# Patient Record
Sex: FEMALE | Race: WHITE | Employment: OTHER | URBAN - NONMETROPOLITAN AREA
[De-identification: names, ages, dates, MRNs, and addresses within clinical notes are randomized per-mention and may not be internally consistent; named-entity substitution may affect disease eponyms.]

---

## 2018-02-26 ENCOUNTER — HOSPITAL ENCOUNTER (OUTPATIENT)
Dept: AUDIOLOGY | Age: 73
Discharge: HOME | End: 2018-02-26
Payer: COMMERCIAL

## 2018-02-26 PROCEDURE — V5020 CONFORMITY EVALUATION: HCPCS | Performed by: AUDIOLOGIST

## 2018-02-26 PROCEDURE — V5160 DISPENSING FEE BINAURAL: HCPCS | Performed by: AUDIOLOGIST

## 2018-02-26 PROCEDURE — S0618 AUDIOMETRY FOR HEARING AID: HCPCS | Performed by: AUDIOLOGIST

## 2018-02-26 PROCEDURE — V5010 ASSESSMENT FOR HEARING AID: HCPCS | Performed by: AUDIOLOGIST

## 2018-03-05 ENCOUNTER — HOSPITAL ENCOUNTER (OUTPATIENT)
Age: 73
Setting detail: SPECIMEN
Discharge: HOME | End: 2018-03-05
Payer: MEDICARE

## 2018-03-05 PROCEDURE — 88312 SPECIAL STAINS GROUP 1: CPT

## 2018-03-05 PROCEDURE — 87205 SMEAR GRAM STAIN: CPT

## 2018-03-05 PROCEDURE — 88304 TISSUE EXAM BY PATHOLOGIST: CPT

## 2018-03-05 PROCEDURE — 87070 CULTURE OTHR SPECIMN AEROBIC: CPT

## 2018-03-07 LAB
AEROBIC CULTURE: NORMAL
GRAM STAIN RESULT: NORMAL

## 2018-03-15 ENCOUNTER — TELEPHONE (OUTPATIENT)
Dept: AUDIOLOGY | Age: 73
End: 2018-03-15

## 2018-03-15 NOTE — TELEPHONE ENCOUNTER
Please schedule Sarabjit Delvalle for a New Hearing Aid Fitting with [facility]. Do not register (Patrice Salazar). Thanks.

## 2018-03-23 ENCOUNTER — HOSPITAL ENCOUNTER (OUTPATIENT)
Dept: AUDIOLOGY | Age: 73
Discharge: HOME | End: 2018-03-23

## 2018-03-23 PROCEDURE — 9990000010 HC NO CHARGE VISIT: Performed by: AUDIOLOGIST

## 2020-06-12 ENCOUNTER — HOSPITAL ENCOUNTER (OUTPATIENT)
Dept: AUDIOLOGY | Age: 75
Discharge: HOME | End: 2020-06-12

## 2020-06-12 PROCEDURE — 9990000010 HC NO CHARGE VISIT: Performed by: AUDIOLOGIST

## 2020-09-27 ENCOUNTER — APPOINTMENT (OUTPATIENT)
Dept: CT IMAGING | Age: 75
DRG: 351 | End: 2020-09-27
Payer: MEDICARE

## 2020-09-27 ENCOUNTER — ANESTHESIA (OUTPATIENT)
Dept: OPERATING ROOM | Age: 75
DRG: 351 | End: 2020-09-27
Payer: MEDICARE

## 2020-09-27 ENCOUNTER — APPOINTMENT (OUTPATIENT)
Dept: GENERAL RADIOLOGY | Age: 75
DRG: 351 | End: 2020-09-27
Payer: MEDICARE

## 2020-09-27 ENCOUNTER — HOSPITAL ENCOUNTER (INPATIENT)
Age: 75
LOS: 2 days | Discharge: HOME | DRG: 351 | End: 2020-09-29
Attending: SURGERY | Admitting: SURGERY
Payer: MEDICARE

## 2020-09-27 ENCOUNTER — ANESTHESIA EVENT (OUTPATIENT)
Dept: OPERATING ROOM | Age: 75
DRG: 351 | End: 2020-09-27
Payer: MEDICARE

## 2020-09-27 VITALS
SYSTOLIC BLOOD PRESSURE: 98 MMHG | DIASTOLIC BLOOD PRESSURE: 61 MMHG | TEMPERATURE: 98.6 F | OXYGEN SATURATION: 100 % | RESPIRATION RATE: 17 BRPM

## 2020-09-27 PROBLEM — K40.90 INGUINAL HERNIA, LEFT: Status: ACTIVE | Noted: 2020-09-27

## 2020-09-27 LAB
ALBUMIN SERPL-MCNC: 4.1 G/DL (ref 3.5–5.1)
ALP BLD-CCNC: 52 U/L (ref 38–126)
ALT SERPL-CCNC: 16 U/L (ref 11–66)
ANION GAP SERPL CALCULATED.3IONS-SCNC: 10 MEQ/L (ref 8–16)
AST SERPL-CCNC: 20 U/L (ref 5–40)
BACTERIA: ABNORMAL /HPF
BASOPHILS # BLD: 0.2 %
BASOPHILS ABSOLUTE: 0 THOU/MM3 (ref 0–0.1)
BILIRUB SERPL-MCNC: 0.2 MG/DL (ref 0.3–1.2)
BILIRUBIN URINE: NEGATIVE
BLOOD, URINE: NEGATIVE
BUN BLDV-MCNC: 12 MG/DL (ref 7–22)
CALCIUM SERPL-MCNC: 9.1 MG/DL (ref 8.5–10.5)
CASTS 2: ABNORMAL /LPF
CASTS UA: ABNORMAL /LPF
CHARACTER, URINE: CLEAR
CHLORIDE BLD-SCNC: 99 MEQ/L (ref 98–111)
CO2: 27 MEQ/L (ref 23–33)
COLOR: ABNORMAL
CREAT SERPL-MCNC: 0.4 MG/DL (ref 0.4–1.2)
CRYSTALS, UA: ABNORMAL
EKG ATRIAL RATE: 82 BPM
EKG P AXIS: 78 DEGREES
EKG P-R INTERVAL: 198 MS
EKG Q-T INTERVAL: 388 MS
EKG QRS DURATION: 74 MS
EKG QTC CALCULATION (BAZETT): 453 MS
EKG R AXIS: 61 DEGREES
EKG T AXIS: 80 DEGREES
EKG VENTRICULAR RATE: 82 BPM
EOSINOPHIL # BLD: 0.3 %
EOSINOPHILS ABSOLUTE: 0 THOU/MM3 (ref 0–0.4)
EPITHELIAL CELLS, UA: ABNORMAL /HPF
ERYTHROCYTE [DISTWIDTH] IN BLOOD BY AUTOMATED COUNT: 13.7 % (ref 11.5–14.5)
ERYTHROCYTE [DISTWIDTH] IN BLOOD BY AUTOMATED COUNT: 46.6 FL (ref 35–45)
GFR SERPL CREATININE-BSD FRML MDRD: > 90 ML/MIN/1.73M2
GLUCOSE BLD-MCNC: 143 MG/DL (ref 70–108)
GLUCOSE URINE: NEGATIVE MG/DL
HCT VFR BLD CALC: 37.5 % (ref 37–47)
HEMOGLOBIN: 12.5 GM/DL (ref 12–16)
IMMATURE GRANS (ABS): 0.04 THOU/MM3 (ref 0–0.07)
IMMATURE GRANULOCYTES: 0.4 %
KETONES, URINE: ABNORMAL
LEUKOCYTE ESTERASE, URINE: ABNORMAL
LIPASE: 27.2 U/L (ref 5.6–51.3)
LYMPHOCYTES # BLD: 9 %
LYMPHOCYTES ABSOLUTE: 0.9 THOU/MM3 (ref 1–4.8)
MCH RBC QN AUTO: 30.6 PG (ref 26–33)
MCHC RBC AUTO-ENTMCNC: 33.3 GM/DL (ref 32.2–35.5)
MCV RBC AUTO: 91.7 FL (ref 81–99)
MISCELLANEOUS 2: ABNORMAL
MONOCYTES # BLD: 3.4 %
MONOCYTES ABSOLUTE: 0.4 THOU/MM3 (ref 0.4–1.3)
NITRITE, URINE: POSITIVE
NUCLEATED RED BLOOD CELLS: 0 /100 WBC
OSMOLALITY CALCULATION: 274.2 MOSMOL/KG (ref 275–300)
PH UA: 6 (ref 5–9)
PLATELET # BLD: 197 THOU/MM3 (ref 130–400)
PMV BLD AUTO: 11.5 FL (ref 9.4–12.4)
POTASSIUM REFLEX MAGNESIUM: 3.7 MEQ/L (ref 3.5–5.2)
PRO-BNP: 67.7 PG/ML (ref 0–900)
PROTEIN UA: NEGATIVE
RBC # BLD: 4.09 MILL/MM3 (ref 4.2–5.4)
RBC URINE: ABNORMAL /HPF
RENAL EPITHELIAL, UA: ABNORMAL
SARS-COV-2, NAAT: NOT DETECTED
SEG NEUTROPHILS: 86.7 %
SEGMENTED NEUTROPHILS ABSOLUTE COUNT: 9 THOU/MM3 (ref 1.8–7.7)
SODIUM BLD-SCNC: 136 MEQ/L (ref 135–145)
SPECIFIC GRAVITY, URINE: 1.02 (ref 1–1.03)
TOTAL PROTEIN: 7 G/DL (ref 6.1–8)
TROPONIN T: < 0.01 NG/ML
UROBILINOGEN, URINE: 1 EU/DL (ref 0–1)
WBC # BLD: 10.4 THOU/MM3 (ref 4.8–10.8)
WBC UA: ABNORMAL /HPF
YEAST: ABNORMAL

## 2020-09-27 PROCEDURE — 87086 URINE CULTURE/COLONY COUNT: CPT

## 2020-09-27 PROCEDURE — 2500000003 HC RX 250 WO HCPCS: Performed by: NURSE ANESTHETIST, CERTIFIED REGISTERED

## 2020-09-27 PROCEDURE — 3600000012 HC SURGERY LEVEL 2 ADDTL 15MIN: Performed by: SURGERY

## 2020-09-27 PROCEDURE — 99285 EMERGENCY DEPT VISIT HI MDM: CPT

## 2020-09-27 PROCEDURE — 84484 ASSAY OF TROPONIN QUANT: CPT

## 2020-09-27 PROCEDURE — 96374 THER/PROPH/DIAG INJ IV PUSH: CPT

## 2020-09-27 PROCEDURE — 93010 ELECTROCARDIOGRAM REPORT: CPT | Performed by: INTERNAL MEDICINE

## 2020-09-27 PROCEDURE — 6370000000 HC RX 637 (ALT 250 FOR IP): Performed by: PHYSICIAN ASSISTANT

## 2020-09-27 PROCEDURE — 87186 SC STD MICRODIL/AGAR DIL: CPT

## 2020-09-27 PROCEDURE — APPSS60 APP SPLIT SHARED TIME 46-60 MINUTES: Performed by: PHYSICIAN ASSISTANT

## 2020-09-27 PROCEDURE — 87077 CULTURE AEROBIC IDENTIFY: CPT

## 2020-09-27 PROCEDURE — 6360000002 HC RX W HCPCS: Performed by: ANESTHESIOLOGY

## 2020-09-27 PROCEDURE — 3600000002 HC SURGERY LEVEL 2 BASE: Performed by: SURGERY

## 2020-09-27 PROCEDURE — 3700000001 HC ADD 15 MINUTES (ANESTHESIA): Performed by: SURGERY

## 2020-09-27 PROCEDURE — 96375 TX/PRO/DX INJ NEW DRUG ADDON: CPT

## 2020-09-27 PROCEDURE — U0002 COVID-19 LAB TEST NON-CDC: HCPCS

## 2020-09-27 PROCEDURE — 99222 1ST HOSP IP/OBS MODERATE 55: CPT | Performed by: SURGERY

## 2020-09-27 PROCEDURE — 6360000002 HC RX W HCPCS: Performed by: NURSE ANESTHETIST, CERTIFIED REGISTERED

## 2020-09-27 PROCEDURE — 71045 X-RAY EXAM CHEST 1 VIEW: CPT

## 2020-09-27 PROCEDURE — 93005 ELECTROCARDIOGRAM TRACING: CPT | Performed by: STUDENT IN AN ORGANIZED HEALTH CARE EDUCATION/TRAINING PROGRAM

## 2020-09-27 PROCEDURE — 96376 TX/PRO/DX INJ SAME DRUG ADON: CPT

## 2020-09-27 PROCEDURE — 74176 CT ABD & PELVIS W/O CONTRAST: CPT

## 2020-09-27 PROCEDURE — 49553 RPR FEM HERNIA INIT BLOCKED: CPT | Performed by: SURGERY

## 2020-09-27 PROCEDURE — 0YU80JZ SUPPLEMENT LEFT FEMORAL REGION WITH SYNTHETIC SUBSTITUTE, OPEN APPROACH: ICD-10-PCS | Performed by: SURGERY

## 2020-09-27 PROCEDURE — 83880 ASSAY OF NATRIURETIC PEPTIDE: CPT

## 2020-09-27 PROCEDURE — C1781 MESH (IMPLANTABLE): HCPCS | Performed by: SURGERY

## 2020-09-27 PROCEDURE — 6360000002 HC RX W HCPCS: Performed by: EMERGENCY MEDICINE

## 2020-09-27 PROCEDURE — 85025 COMPLETE CBC W/AUTO DIFF WBC: CPT

## 2020-09-27 PROCEDURE — 83690 ASSAY OF LIPASE: CPT

## 2020-09-27 PROCEDURE — 7100000000 HC PACU RECOVERY - FIRST 15 MIN: Performed by: SURGERY

## 2020-09-27 PROCEDURE — 36415 COLL VENOUS BLD VENIPUNCTURE: CPT

## 2020-09-27 PROCEDURE — 2500000003 HC RX 250 WO HCPCS: Performed by: SURGERY

## 2020-09-27 PROCEDURE — 1200000000 HC SEMI PRIVATE

## 2020-09-27 PROCEDURE — 6360000002 HC RX W HCPCS: Performed by: STUDENT IN AN ORGANIZED HEALTH CARE EDUCATION/TRAINING PROGRAM

## 2020-09-27 PROCEDURE — 2580000003 HC RX 258: Performed by: STUDENT IN AN ORGANIZED HEALTH CARE EDUCATION/TRAINING PROGRAM

## 2020-09-27 PROCEDURE — 2580000003 HC RX 258: Performed by: SURGERY

## 2020-09-27 PROCEDURE — 7100000001 HC PACU RECOVERY - ADDTL 15 MIN: Performed by: SURGERY

## 2020-09-27 PROCEDURE — 80053 COMPREHEN METABOLIC PANEL: CPT

## 2020-09-27 PROCEDURE — 3700000000 HC ANESTHESIA ATTENDED CARE: Performed by: SURGERY

## 2020-09-27 PROCEDURE — 81001 URINALYSIS AUTO W/SCOPE: CPT

## 2020-09-27 PROCEDURE — 6360000002 HC RX W HCPCS: Performed by: SURGERY

## 2020-09-27 DEVICE — MESH HERN L W1.6XL1.9IN INGUINAL WHT POLYPR MFIL PLUG PTCH: Type: IMPLANTABLE DEVICE | Site: GROIN | Status: FUNCTIONAL

## 2020-09-27 RX ORDER — ONDANSETRON 2 MG/ML
4 INJECTION INTRAMUSCULAR; INTRAVENOUS
Status: DISCONTINUED | OUTPATIENT
Start: 2020-09-27 | End: 2020-09-27

## 2020-09-27 RX ORDER — LABETALOL 20 MG/4 ML (5 MG/ML) INTRAVENOUS SYRINGE
5 EVERY 10 MIN PRN
Status: DISCONTINUED | OUTPATIENT
Start: 2020-09-27 | End: 2020-09-27

## 2020-09-27 RX ORDER — SODIUM CHLORIDE 0.9 % (FLUSH) 0.9 %
10 SYRINGE (ML) INJECTION PRN
Status: DISCONTINUED | OUTPATIENT
Start: 2020-09-27 | End: 2020-09-29 | Stop reason: HOSPADM

## 2020-09-27 RX ORDER — MORPHINE SULFATE 2 MG/ML
2 INJECTION, SOLUTION INTRAMUSCULAR; INTRAVENOUS
Status: DISCONTINUED | OUTPATIENT
Start: 2020-09-27 | End: 2020-09-29 | Stop reason: HOSPADM

## 2020-09-27 RX ORDER — TRAMADOL HYDROCHLORIDE 50 MG/1
50 TABLET ORAL EVERY 6 HOURS PRN
Status: DISCONTINUED | OUTPATIENT
Start: 2020-09-27 | End: 2020-09-29

## 2020-09-27 RX ORDER — PROMETHAZINE HYDROCHLORIDE 25 MG/1
12.5 TABLET ORAL EVERY 6 HOURS PRN
Status: DISCONTINUED | OUTPATIENT
Start: 2020-09-27 | End: 2020-09-29 | Stop reason: HOSPADM

## 2020-09-27 RX ORDER — DEXAMETHASONE SODIUM PHOSPHATE 4 MG/ML
INJECTION, SOLUTION INTRA-ARTICULAR; INTRALESIONAL; INTRAMUSCULAR; INTRAVENOUS; SOFT TISSUE PRN
Status: DISCONTINUED | OUTPATIENT
Start: 2020-09-27 | End: 2020-09-27 | Stop reason: SDUPTHER

## 2020-09-27 RX ORDER — FENTANYL CITRATE 50 UG/ML
50 INJECTION, SOLUTION INTRAMUSCULAR; INTRAVENOUS ONCE
Status: COMPLETED | OUTPATIENT
Start: 2020-09-27 | End: 2020-09-27

## 2020-09-27 RX ORDER — ONDANSETRON 2 MG/ML
4 INJECTION INTRAMUSCULAR; INTRAVENOUS ONCE
Status: COMPLETED | OUTPATIENT
Start: 2020-09-27 | End: 2020-09-27

## 2020-09-27 RX ORDER — SODIUM CHLORIDE 0.9 % (FLUSH) 0.9 %
10 SYRINGE (ML) INJECTION PRN
Status: DISCONTINUED | OUTPATIENT
Start: 2020-09-27 | End: 2020-09-27 | Stop reason: SDUPTHER

## 2020-09-27 RX ORDER — FENTANYL CITRATE 50 UG/ML
50 INJECTION, SOLUTION INTRAMUSCULAR; INTRAVENOUS EVERY 5 MIN PRN
Status: DISCONTINUED | OUTPATIENT
Start: 2020-09-27 | End: 2020-09-27

## 2020-09-27 RX ORDER — PROPOFOL 10 MG/ML
INJECTION, EMULSION INTRAVENOUS PRN
Status: DISCONTINUED | OUTPATIENT
Start: 2020-09-27 | End: 2020-09-27 | Stop reason: SDUPTHER

## 2020-09-27 RX ORDER — SODIUM CHLORIDE 9 MG/ML
1000 INJECTION, SOLUTION INTRAVENOUS CONTINUOUS
Status: DISCONTINUED | OUTPATIENT
Start: 2020-09-27 | End: 2020-09-27

## 2020-09-27 RX ORDER — ONDANSETRON 2 MG/ML
4 INJECTION INTRAMUSCULAR; INTRAVENOUS EVERY 6 HOURS PRN
Status: DISCONTINUED | OUTPATIENT
Start: 2020-09-27 | End: 2020-09-29 | Stop reason: HOSPADM

## 2020-09-27 RX ORDER — LIDOCAINE HCL/PF 100 MG/5ML
SYRINGE (ML) INJECTION PRN
Status: DISCONTINUED | OUTPATIENT
Start: 2020-09-27 | End: 2020-09-27 | Stop reason: SDUPTHER

## 2020-09-27 RX ORDER — SODIUM CHLORIDE 0.9 % (FLUSH) 0.9 %
10 SYRINGE (ML) INJECTION EVERY 12 HOURS SCHEDULED
Status: DISCONTINUED | OUTPATIENT
Start: 2020-09-27 | End: 2020-09-27 | Stop reason: SDUPTHER

## 2020-09-27 RX ORDER — ONDANSETRON 4 MG/1
4 TABLET, ORALLY DISINTEGRATING ORAL EVERY 8 HOURS PRN
Status: DISCONTINUED | OUTPATIENT
Start: 2020-09-27 | End: 2020-09-29 | Stop reason: HOSPADM

## 2020-09-27 RX ORDER — BUPIVACAINE HYDROCHLORIDE 5 MG/ML
INJECTION, SOLUTION EPIDURAL; INTRACAUDAL PRN
Status: DISCONTINUED | OUTPATIENT
Start: 2020-09-27 | End: 2020-09-27 | Stop reason: ALTCHOICE

## 2020-09-27 RX ORDER — FENTANYL CITRATE 50 UG/ML
50 INJECTION, SOLUTION INTRAMUSCULAR; INTRAVENOUS
Status: DISCONTINUED | OUTPATIENT
Start: 2020-09-27 | End: 2020-09-27

## 2020-09-27 RX ORDER — MEPERIDINE HYDROCHLORIDE 25 MG/ML
12.5 INJECTION INTRAMUSCULAR; INTRAVENOUS; SUBCUTANEOUS EVERY 5 MIN PRN
Status: DISCONTINUED | OUTPATIENT
Start: 2020-09-27 | End: 2020-09-27

## 2020-09-27 RX ORDER — FENTANYL CITRATE 50 UG/ML
25 INJECTION, SOLUTION INTRAMUSCULAR; INTRAVENOUS
Status: DISCONTINUED | OUTPATIENT
Start: 2020-09-27 | End: 2020-09-27

## 2020-09-27 RX ORDER — FENTANYL CITRATE 50 UG/ML
INJECTION, SOLUTION INTRAMUSCULAR; INTRAVENOUS PRN
Status: DISCONTINUED | OUTPATIENT
Start: 2020-09-27 | End: 2020-09-27 | Stop reason: SDUPTHER

## 2020-09-27 RX ORDER — SODIUM CHLORIDE 9 MG/ML
INJECTION, SOLUTION INTRAVENOUS CONTINUOUS
Status: DISCONTINUED | OUTPATIENT
Start: 2020-09-27 | End: 2020-09-27

## 2020-09-27 RX ORDER — MORPHINE SULFATE 4 MG/ML
4 INJECTION, SOLUTION INTRAMUSCULAR; INTRAVENOUS
Status: DISCONTINUED | OUTPATIENT
Start: 2020-09-27 | End: 2020-09-29 | Stop reason: HOSPADM

## 2020-09-27 RX ORDER — MORPHINE SULFATE 4 MG/ML
4 INJECTION, SOLUTION INTRAMUSCULAR; INTRAVENOUS ONCE
Status: COMPLETED | OUTPATIENT
Start: 2020-09-27 | End: 2020-09-27

## 2020-09-27 RX ORDER — ONDANSETRON 2 MG/ML
INJECTION INTRAMUSCULAR; INTRAVENOUS PRN
Status: DISCONTINUED | OUTPATIENT
Start: 2020-09-27 | End: 2020-09-27 | Stop reason: SDUPTHER

## 2020-09-27 RX ORDER — SUCCINYLCHOLINE/SOD CL,ISO/PF 200MG/10ML
SYRINGE (ML) INTRAVENOUS PRN
Status: DISCONTINUED | OUTPATIENT
Start: 2020-09-27 | End: 2020-09-27 | Stop reason: SDUPTHER

## 2020-09-27 RX ORDER — FENTANYL CITRATE 50 UG/ML
25 INJECTION, SOLUTION INTRAMUSCULAR; INTRAVENOUS EVERY 5 MIN PRN
Status: DISCONTINUED | OUTPATIENT
Start: 2020-09-27 | End: 2020-09-27

## 2020-09-27 RX ORDER — SODIUM CHLORIDE 0.9 % (FLUSH) 0.9 %
10 SYRINGE (ML) INJECTION EVERY 12 HOURS SCHEDULED
Status: DISCONTINUED | OUTPATIENT
Start: 2020-09-27 | End: 2020-09-29 | Stop reason: HOSPADM

## 2020-09-27 RX ORDER — PROMETHAZINE HYDROCHLORIDE 25 MG/ML
6.25 INJECTION, SOLUTION INTRAMUSCULAR; INTRAVENOUS
Status: DISCONTINUED | OUTPATIENT
Start: 2020-09-27 | End: 2020-09-27

## 2020-09-27 RX ORDER — SODIUM CHLORIDE 9 MG/ML
INJECTION, SOLUTION INTRAVENOUS CONTINUOUS
Status: DISCONTINUED | OUTPATIENT
Start: 2020-09-27 | End: 2020-09-29

## 2020-09-27 RX ADMIN — FAMOTIDINE 20 MG: 10 INJECTION INTRAVENOUS at 09:08

## 2020-09-27 RX ADMIN — ONDANSETRON 4 MG: 2 INJECTION INTRAMUSCULAR; INTRAVENOUS at 00:45

## 2020-09-27 RX ADMIN — CEFOXITIN SODIUM 2 G: 2 POWDER, FOR SOLUTION INTRAVENOUS at 09:08

## 2020-09-27 RX ADMIN — SODIUM CHLORIDE: 9 INJECTION, SOLUTION INTRAVENOUS at 19:38

## 2020-09-27 RX ADMIN — CEFTRIAXONE SODIUM 1 G: 1 INJECTION, POWDER, FOR SOLUTION INTRAMUSCULAR; INTRAVENOUS at 03:35

## 2020-09-27 RX ADMIN — FENTANYL CITRATE 50 MCG: 50 INJECTION, SOLUTION INTRAMUSCULAR; INTRAVENOUS at 05:51

## 2020-09-27 RX ADMIN — MORPHINE SULFATE 4 MG: 4 INJECTION, SOLUTION INTRAMUSCULAR; INTRAVENOUS at 13:40

## 2020-09-27 RX ADMIN — HYDROMORPHONE HYDROCHLORIDE 0.5 MG: 1 INJECTION, SOLUTION INTRAMUSCULAR; INTRAVENOUS; SUBCUTANEOUS at 06:00

## 2020-09-27 RX ADMIN — CEFOXITIN SODIUM 2 G: 2 POWDER, FOR SOLUTION INTRAVENOUS at 15:51

## 2020-09-27 RX ADMIN — PROPOFOL 130 MG: 10 INJECTION, EMULSION INTRAVENOUS at 04:59

## 2020-09-27 RX ADMIN — FENTANYL CITRATE 50 MCG: 50 INJECTION, SOLUTION INTRAMUSCULAR; INTRAVENOUS at 02:32

## 2020-09-27 RX ADMIN — ONDANSETRON 4 MG: 2 INJECTION INTRAMUSCULAR; INTRAVENOUS at 04:29

## 2020-09-27 RX ADMIN — FENTANYL CITRATE 25 MCG: 50 INJECTION, SOLUTION INTRAMUSCULAR; INTRAVENOUS at 05:47

## 2020-09-27 RX ADMIN — HYDROMORPHONE HYDROCHLORIDE 0.5 MG: 1 INJECTION, SOLUTION INTRAMUSCULAR; INTRAVENOUS; SUBCUTANEOUS at 05:50

## 2020-09-27 RX ADMIN — FENTANYL CITRATE 25 MCG: 50 INJECTION, SOLUTION INTRAMUSCULAR; INTRAVENOUS at 05:20

## 2020-09-27 RX ADMIN — Medication 130 MG: at 04:59

## 2020-09-27 RX ADMIN — MORPHINE SULFATE 4 MG: 4 INJECTION, SOLUTION INTRAMUSCULAR; INTRAVENOUS at 00:45

## 2020-09-27 RX ADMIN — FAMOTIDINE 20 MG: 10 INJECTION INTRAVENOUS at 21:03

## 2020-09-27 RX ADMIN — FENTANYL CITRATE 50 MCG: 50 INJECTION, SOLUTION INTRAMUSCULAR; INTRAVENOUS at 01:49

## 2020-09-27 RX ADMIN — Medication 80 MG: at 04:59

## 2020-09-27 RX ADMIN — PHENOL 1 SPRAY: 1.4 SPRAY ORAL at 14:57

## 2020-09-27 RX ADMIN — SODIUM CHLORIDE: 9 INJECTION, SOLUTION INTRAVENOUS at 09:08

## 2020-09-27 RX ADMIN — ONDANSETRON HYDROCHLORIDE 4 MG: 4 INJECTION, SOLUTION INTRAMUSCULAR; INTRAVENOUS at 05:05

## 2020-09-27 RX ADMIN — ONDANSETRON 4 MG: 2 INJECTION INTRAMUSCULAR; INTRAVENOUS at 01:49

## 2020-09-27 RX ADMIN — DEXAMETHASONE SODIUM PHOSPHATE 10 MG: 4 INJECTION, SOLUTION INTRAMUSCULAR; INTRAVENOUS at 05:05

## 2020-09-27 RX ADMIN — HYDROMORPHONE HYDROCHLORIDE 0.5 MG: 1 INJECTION, SOLUTION INTRAMUSCULAR; INTRAVENOUS; SUBCUTANEOUS at 06:05

## 2020-09-27 RX ADMIN — SODIUM CHLORIDE 1000 ML: 9 INJECTION, SOLUTION INTRAVENOUS at 00:45

## 2020-09-27 RX ADMIN — HYDROMORPHONE HYDROCHLORIDE 0.5 MG: 1 INJECTION, SOLUTION INTRAMUSCULAR; INTRAVENOUS; SUBCUTANEOUS at 05:55

## 2020-09-27 RX ADMIN — CEFOXITIN SODIUM 2 G: 2 POWDER, FOR SOLUTION INTRAVENOUS at 21:54

## 2020-09-27 ASSESSMENT — PULMONARY FUNCTION TESTS
PIF_VALUE: 18
PIF_VALUE: 17
PIF_VALUE: 17
PIF_VALUE: 2
PIF_VALUE: 24
PIF_VALUE: 0
PIF_VALUE: 4
PIF_VALUE: 17
PIF_VALUE: 18
PIF_VALUE: 17
PIF_VALUE: 17
PIF_VALUE: 21
PIF_VALUE: 17
PIF_VALUE: 14
PIF_VALUE: 2
PIF_VALUE: 17
PIF_VALUE: 17
PIF_VALUE: 1
PIF_VALUE: 22
PIF_VALUE: 18
PIF_VALUE: 4
PIF_VALUE: 18
PIF_VALUE: 17
PIF_VALUE: 10
PIF_VALUE: 17
PIF_VALUE: 18
PIF_VALUE: 4
PIF_VALUE: 0
PIF_VALUE: 9
PIF_VALUE: 17
PIF_VALUE: 18
PIF_VALUE: 13
PIF_VALUE: 2
PIF_VALUE: 18
PIF_VALUE: 17
PIF_VALUE: 13
PIF_VALUE: 2
PIF_VALUE: 1
PIF_VALUE: 17
PIF_VALUE: 18
PIF_VALUE: 1
PIF_VALUE: 17
PIF_VALUE: 2
PIF_VALUE: 17
PIF_VALUE: 17
PIF_VALUE: 19
PIF_VALUE: 19
PIF_VALUE: 17
PIF_VALUE: 15
PIF_VALUE: 15
PIF_VALUE: 13
PIF_VALUE: 17

## 2020-09-27 ASSESSMENT — PAIN SCALES - GENERAL
PAINLEVEL_OUTOF10: 10
PAINLEVEL_OUTOF10: 5
PAINLEVEL_OUTOF10: 10
PAINLEVEL_OUTOF10: 10
PAINLEVEL_OUTOF10: 8
PAINLEVEL_OUTOF10: 10
PAINLEVEL_OUTOF10: 7
PAINLEVEL_OUTOF10: 10
PAINLEVEL_OUTOF10: 8

## 2020-09-27 ASSESSMENT — ENCOUNTER SYMPTOMS
STRIDOR: 0
BACK PAIN: 0
DIARRHEA: 0
VOICE CHANGE: 0
SORE THROAT: 0
SHORTNESS OF BREATH: 0
VOMITING: 1
ABDOMINAL PAIN: 1
BLOOD IN STOOL: 0
CONSTIPATION: 1
WHEEZING: 0
NAUSEA: 1
CHOKING: 0
COUGH: 0
TROUBLE SWALLOWING: 0
CHEST TIGHTNESS: 0
PHOTOPHOBIA: 0

## 2020-09-27 ASSESSMENT — PAIN DESCRIPTION - DESCRIPTORS: DESCRIPTORS: SHARP

## 2020-09-27 ASSESSMENT — PAIN DESCRIPTION - LOCATION: LOCATION: ABDOMEN;CHEST

## 2020-09-27 ASSESSMENT — PAIN DESCRIPTION - PAIN TYPE
TYPE: ACUTE PAIN
TYPE: ACUTE PAIN

## 2020-09-27 NOTE — PLAN OF CARE
Problem: OXYGENATION/RESPIRATORY FUNCTION  Goal: Patient will achieve/maintain normal respiratory rate/effort  Outcome: Ongoing  Note: Patient off oxygen this shift. Problem: SKIN INTEGRITY  Goal: Skin integrity is maintained or improved  Outcome: Ongoing  Note: Surgical incision clean, dry this shift. Care plan reviewed with patient. Patient verbalizes understanding of the plan of care and contribute to goal setting.

## 2020-09-27 NOTE — BRIEF OP NOTE
Brief Postoperative Note      Patient: Mark Jean Baptiste  YOB: 1945  MRN: [id number]    Date of Procedure: 9/27/2020    Pre-Op Diagnosis: incarcerated inguinal hernia    Post-Op Diagnosis: incarcerated femoral hernia       Procedure: repair incarcerated femoral hernia with mesh  Surgeon(s):  Vandana Parnell MD    Assistant:  * No surgical staff found *    Anesthesia: General    Estimated Blood Loss (mL): less than 50     Complications: None    Specimens:   * No specimens in log *    Implants:  Implant Name Type Inv. Item Serial No.  Lot No. LRB No. Used Action   MESH SURG GURJIT PLUG PERFIX LG 1.6X1. 9IN Mesh MESH SURG GURJIT PLUG PERFIX LG 1.6X1.9IN  CR [employer] [id number] Left 1 Implanted         Drains:   NG/OG/NJ/NE Tube 18 fr Right mouth (Active)   Surrounding Skin Dry; Intact 09/27/20 0253   Securement device Yes 09/27/20 0253   Placement Verified by Gastric Contents;by X-Ray (Initial) 09/27/20 0253   Drainage Appearance Bile 09/27/20 0253       Urethral Catheter Non-latex 16 fr (Active)       Findings: incarcerated femoral hernia omentum and small knuckle of viable bowel    Electronically signed by Vandana Parnell MD on 9/27/2020 at 5:40 AM

## 2020-09-27 NOTE — H&P
MCG tablet Take 100 mcg by mouth daily. Historical Provider, MD   latanoprost (XALATAN) 0.005 % ophthalmic solution 1 drop nightly. Historical Provider, MD    Scheduled Meds:   sodium chloride flush  10 mL Intravenous 2 times per day    famotidine (PEPCID) injection  20 mg Intravenous BID     Continuous Infusions:   sodium chloride 1,000 mL (20 0045)    sodium chloride       PRN Meds:. Allergies  is allergic to optiray [ioversol]; codeine; dye [iodides]; iodine; pcn [penicillins]; and sulfa antibiotics. Family History  family history includes Cancer in her father. Social History   reports that she has quit smoking. She has never used smokeless tobacco. She reports previous alcohol use. She reports that she does not use drugs. Review of Systems:  General Denies any fever or chills  HEENT Denies any diplopia, tinnitus or vertigo  Resp Denies any shortness of breath, cough or wheezing  Cardiac Denies any chest pain, palpitations, claudication or edema  GI Positive for nausea, vomiting, diarrhea and abdominal pain  Denies any melena, hematochezia, hematemesis or pyrosis   Denies any frequency, urgency, hesitancy or incontinence  Heme Denies bruising or bleeding easily  Endocrine Denies any history of diabetes or thyroid disease  Neuro Denies any focal motor or sensory deficits  OBJECTIVE   CURRENT VITALS:  oral temperature is 98.8 °F (37.1 °C). Her blood pressure is 125/83 and her pulse is 97. Her respiration is 22 and oxygen saturation is 92%. There is no height or weight on file to calculate BMI.   Temperature Range (24h):Temp: 98.8 °F (37.1 °C) Temp  Av.7 °F (37.1 °C)  Min: 98.6 °F (37 °C)  Max: 98.8 °F (37.1 °C)  BP Range (84J): Systolic (01PQK), DPR:071 , Min:81 , QTU:197     Diastolic (21YQJ), NTW:00, Min:42, Max:83    Pulse Range (24h): Pulse  Av.8  Min: 86  Max: 97  Respiration Range (24h): Resp  Av.8  Min: 0  Max: 28  Current Pulse Ox (24h):  SpO2: 92 %  Pulse Ox Range This document has been electronically signed by: Nancy Bledsoe MD on    09/27/2020 01:27 AM                 Electronically signed by KARLY Hall on 9/27/2020 at 6:02 AM       Patient seen and evaluated in the emergency department independently. Painful incarcerated left inguinal hernia. No clinically significant ventral hernia at this time. Not reducible. COVID screen negative. Urgent surgical intervention recommended. Risks of surgery including not limited to need for laparotomy and even possible bowel resection if segment of bowel found to be ischemic. Patient expressed understanding wish to proceed. All data and imaging reviewed. Care coordinated with CPAP, PA-C. Agree as documented.

## 2020-09-27 NOTE — PROGRESS NOTES
Patient arrived to 6E 64 from surgery via bed with  present. IV of LR @ 100 ml/hr infusing with 400 mls infused and 600 mls left in bag. Oriented patient and  to unit, room, and plan of care.

## 2020-09-27 NOTE — OP NOTE
[address] [place] [address]                                OPERATIVE REPORT    PATIENT NAME: Marcos Baron                 :        1945  MED REC NO:   [id number]                           ROOM:       3769  ACCOUNT NO:   [de-identified]                           ADMIT DATE: 2020  PROVIDER:     Ella Salvador M.D.    DATE OF PROCEDURE:  2020    PREOPERATIVE DIAGNOSIS:  Incarcerated inguinal hernia. POSTOPERATIVE DIAGNOSIS:  Incarcerated femoral hernia. PROCEDURE:  Open repair of incarcerated femoral hernia with  polypropylene plug to close femoral space. SURGEON:  Ella Salvador M.D. ANESTHESIA:  General.    ESTIMATED BLOOD LOSS:  Less than 20 mL. SPECIMEN:  None. INDICATIONS:  The patient is a 72-year-old female who had acute onset of  abdominal pain with nausea and vomiting earlier in the evening. Emesis  was bilious. She has had a history of multiple remote ventral  herniorrhaphies. She had not previously noted a bulge in her left  groin. She was seen and evaluated in emergency department. CT scan was  obtained. Surgical Services were contacted. CT scan revealed an  incarcerated left inguinal hernia. She had some smaller defects along  the mid abdomen. These are not clinically significant at this time. The hernia was felt to contain small bowel. It was not reducible. She  was recommended urgent surgical intervention. COVID-19 screen was  negative. Risks of procedure were discussed with the patient including  conversion to open procedure and potential need for bowel resection. The patient agreed and all questions were answered. She was given  Rocephin intravenously in the emergency department. She tolerated this  without complication.     DESCRIPTION OF PROCEDURE:  The patient was brought to the operating  suite where she was placed supine on the operating table with pneumatic  sequential compression devices on her lower extremities. She was  administered general anesthesia via endotracheal intubation. She had  pneumatic sequential compression devices on her lower extremities. Julio catheter was sterilely inserted. It was removed at the end of the  procedure. The patient's abdomen and left groin were clipped, prepped  and draped. Incision was made over the incarcerated mass and dissection  was carried down. The incarcerated mass was encountered. Hernia sac  was opened. It contained incarcerated omentum. Small knuckle of bowel  which appeared viable. The fascia of the inguinal canal was further  opened to allow reduction of the hernia. Seemed to be more of an  incarcerated femoral hernia. The defect and femoral space was closed  down with polypropylene plug which was secured to surrounding fascial  structures with interrupted Prolene suture. There was no evidence of  indirect inguinal hernia. The direct space had been closed with suture. Rock's fascia was then closed with interrupted Vicryl suture. The  entire area had been irrigated with Irrisept solution. Area was  infiltrated with 0.5% Marcaine. After Rock's fascia was closed,  dermis was closed with interrupted 3-0 Vicryl suture and skin was closed  with running subcuticular 4-0 Vicryl suture followed by application of  skin glue. Sponge, sharp, instruments counts were correct. The patient  tolerated the procedure well.         Dulce Riedel, M.D.    D: 09/27/2020 5:58:50       T: 09/27/2020 6:01:21     [email or web address]  Job#: [id number]     Doc#: [id number]    CC:

## 2020-09-27 NOTE — ANESTHESIA PRE PROCEDURE
Department of Anesthesiology  Preprocedure Note       Name:  Sahil Mendoza   Age:  70 y.o.  :  1945                                          MRN:  [id number]         Date:  2020      Surgeon: Kathy Villaseñor):  Glendy Gagnon MD    Procedure: Procedure(s): HERNIA INGUINAL REPAIR    Medications prior to admission:   Prior to Admission medications    Medication Sig Start Date End Date Taking? Authorizing Provider   Magnesium Oxide 600 MG CAPS Take  by mouth nightly. Historical Provider, MD   Melatonin 3 MG TABS Take 3 mg by mouth nightly. Historical Provider, MD   MULTIPLE VITAMINS PO Take  by mouth daily. Historical Provider, MD   Cholecalciferol (VITAMIN D) 2000 UNITS TABS Take 4,000 Units by mouth daily. Historical Provider, MD   levothyroxine (SYNTHROID) 125 MCG tablet Take 100 mcg by mouth daily. Historical Provider, MD   latanoprost (XALATAN) 0.005 % ophthalmic solution 1 drop nightly.       Historical Provider, MD       Current medications:    Current Facility-Administered Medications   Medication Dose Route Frequency Provider Last Rate Last Dose    0.9 % sodium chloride infusion  1,000 mL Intravenous Continuous [place] Danielle  mL/hr at 20 0045 1,000 mL at 20 0045    sodium chloride flush 0.9 % injection 10 mL  10 mL Intravenous 2 times per day KARLY Jaime        sodium chloride flush 0.9 % injection 10 mL  10 mL Intravenous PRN KARLY Jaime        ondansetron (ZOFRAN-ODT) disintegrating tablet 4 mg  4 mg Oral Q8H PRN KARLY Rm        Or    ondansetron (ZOFRAN) injection 4 mg  4 mg Intravenous Q6H PRN KARLY Rm        0.9 % sodium chloride infusion   Intravenous Continuous KARLY Rm        famotidine (PEPCID) injection 20 mg  20 mg Intravenous BID KARLY Rm        fentaNYL (SUBLIMAZE) injection 25 mcg  25 mcg Intravenous Q1H PRN KARLY Rm        Or    fentaNYL (SUBLIMAZE) injection 50 mcg  50 mcg Intravenous Q1H PRN KARLY Silverman         Current Outpatient Medications   Medication Sig Dispense Refill    Magnesium Oxide 600 MG CAPS Take  by mouth nightly.  Melatonin 3 MG TABS Take 3 mg by mouth nightly.  MULTIPLE VITAMINS PO Take  by mouth daily.  Cholecalciferol (VITAMIN D) 2000 UNITS TABS Take 4,000 Units by mouth daily.  levothyroxine (SYNTHROID) 125 MCG tablet Take 100 mcg by mouth daily.  latanoprost (XALATAN) 0.005 % ophthalmic solution 1 drop nightly. Allergies:     Allergies   Allergen Reactions    Optiray [Ioversol] Anaphylaxis     Throat swelling code blue was called and patient also develop hives    Codeine     Dye [Iodides]     Iodine     Pcn [Penicillins]     Sulfa Antibiotics        Problem List:    Patient Active Problem List   Diagnosis Code    Cystocele [id number]    Inguinal hernia, left K40.90       Past Medical History:        Diagnosis Date    Asthma     Glaucoma        Past Surgical History:        Procedure Laterality Date    CHOLECYSTECTOMY  2005    HERNIA REPAIR  [id number]    abdominal    HYSTERECTOMY  1973    INCONTINENCE SURGERY  5-13-12    transobturator pelvilace sling-Dr Candice Gandhi       Social History:    Social History     Tobacco Use    Smoking status: Former Smoker    Smokeless tobacco: Never Used   Substance Use Topics    Alcohol use: Not Currently                                Counseling given: Not Answered      Vital Signs (Current):   Vitals:    09/27/20 0021 09/27/20 0152 09/27/20 0338 09/27/20 0410   BP: 107/67 114/76 125/83 125/83   Pulse: 88 87 91 97   Resp: 28 24 18 22   Temp: 98.8 °F (37.1 °C)      TempSrc: Oral      SpO2: 97% 96%  92%                                              BP Readings from Last 3 Encounters:   09/27/20 125/83   08/21/12 124/83   07/31/12 118/78       NPO Status:                                                                                 BMI:   Wt Readings from Last 3 Encounters: 08/21/12 185 lb (83.9 kg)   07/31/12 187 lb (84.8 kg)   07/10/12 187 lb (84.8 kg)     There is no height or weight on file to calculate BMI.    CBC:   Lab Results   Component Value Date    WBC 10.4 09/27/2020    RBC 4.09 09/27/2020    RBC 3.64 06/02/2012    HGB 12.5 09/27/2020    HCT 37.5 09/27/2020    MCV 91.7 09/27/2020    RDW 13.9 06/02/2012     09/27/2020       CMP:   Lab Results   Component Value Date     09/27/2020    K 3.7 09/27/2020    CL 99 09/27/2020    CO2 27 09/27/2020    BUN 12 09/27/2020    CREATININE 0.4 09/27/2020    LABGLOM >90 09/27/2020    GLUCOSE 143 09/27/2020    GLUCOSE 147 05/08/2012    PROT 7.0 09/27/2020    CALCIUM 9.1 09/27/2020    BILITOT 0.2 09/27/2020    ALKPHOS 52 09/27/2020    AST 20 09/27/2020    ALT 16 09/27/2020       POC Tests: No results for input(s): POCGLU, POCNA, POCK, POCCL, POCBUN, POCHEMO, POCHCT in the last 72 hours. Coags:   Lab Results   Component Value Date    APTT 29.1 05/07/2012       HCG (If Applicable): No results found for: PREGTESTUR, PREGSERUM, HCG, HCGQUANT     ABGs: No results found for: PHART, PO2ART, JBE8JVG, YDY3PDF, BEART, I8OEHOWZ     Type & Screen (If Applicable):  No results found for: LABABO, LABRH    Drug/Infectious Status (If Applicable):  No results found for: HIV, HEPCAB    COVID-19 Screening (If Applicable):   Lab Results   Component Value Date    COVID19 NOT DETECTED 09/27/2020         Anesthesia Evaluation   no history of anesthetic complications:   Airway: Mallampati: II  TM distance: >3 FB   Neck ROM: full  Mouth opening: > = 3 FB Dental:          Pulmonary:normal exam    (+) asthma:           Patient did not smoke on day of surgery.                  Cardiovascular:  Exercise tolerance: good (>4 METS),                     Neuro/Psych:   Negative Neuro/Psych ROS              GI/Hepatic/Renal: Neg GI/Hepatic/Renal ROS            Endo/Other:    (+) hypothyroidism::., .          Pt had no PAT visit       Abdominal: Vascular: negative vascular ROS. Anesthesia Plan      general     ASA 3 - emergent       Induction: intravenous and rapid sequence. MIPS: Postoperative opioids intended and Prophylactic antiemetics administered. Anesthetic plan and risks discussed with patient. Plan discussed with CRNA.                   Abram Huitron MD   9/27/2020

## 2020-09-27 NOTE — ED PROVIDER NOTES
[facility] ENCOUNTER          Pt Name: Annamary Curling  MRN: [id number]  [place] 1945  Date of evaluation: 9/27/2020  Treating Resident Physician: Toya Love MD  Supervising Physician: Dr. Vin You       Chief Complaint   Patient presents with    Emesis    Abdominal Pain     History obtained from the patient. HISTORY OF PRESENT ILLNESS    HPI  Annamary Curling is a 70 y.o. female past surgical history of multiple abdominal surgeries, multiple hernias, who presents to the emergency department for evaluation of acute onset belly pain, no bowel movement, decreased urination. Patient states that at 9 PM tonight, she had sudden onset of bilateral flank pain radiating down into her groin. She describes it as the worst pain she has ever felt. She describes it as a tightness and a sharpness at the same time. Currently 10 out of 10. Worse with movement, worse with straining. Her last bowel movement was yesterday. Last void was 4 PM.  Has had 2 episodes of emesis since then, nonbloody non-bilious. The patient has no other acute complaints at this time. REVIEW OF SYSTEMS   Review of Systems   Constitutional: Negative for chills, fatigue and fever. HENT: Negative for sneezing, sore throat, trouble swallowing and voice change. Eyes: Negative for photophobia and visual disturbance. Respiratory: Negative for cough, choking, chest tightness, shortness of breath, wheezing and stridor. Cardiovascular: Negative for chest pain, palpitations and leg swelling. Gastrointestinal: Positive for abdominal pain, constipation, nausea and vomiting. Negative for blood in stool and diarrhea. Genitourinary: Positive for difficulty urinating, dysuria, frequency, pelvic pain and urgency. Negative for hematuria and vaginal discharge.    Musculoskeletal: Negative for arthralgias, back pain, gait problem, joint swelling, myalgias, neck pain and neck stiffness. Neurological: Negative for dizziness, tremors, seizures, syncope, facial asymmetry, speech difficulty, weakness, light-headedness, numbness and headaches. Hematological: Negative for adenopathy. Does not bruise/bleed easily.          PAST MEDICAL AND SURGICAL HISTORY     Past Medical History:   Diagnosis Date    Asthma     Glaucoma      Past Surgical History:   Procedure Laterality Date    CHOLECYSTECTOMY  2005    HERNIA REPAIR  [id number]    abdominal    HYSTERECTOMY  1973    INCONTINENCE SURGERY  5-13-12    transobturator pelvilace sling-Dr Amparo Scruggs         MEDICATIONS     Current Facility-Administered Medications:     0.9 % sodium chloride infusion, 1,000 mL, Intravenous, Continuous, Rosario Rodriguez MD, Last Rate: 250 mL/hr at 09/27/20 0045, 1,000 mL at 09/27/20 0045    sodium chloride flush 0.9 % injection 10 mL, 10 mL, Intravenous, 2 times per day, KARLY Ferrer    sodium chloride flush 0.9 % injection 10 mL, 10 mL, Intravenous, PRN, KARLY Ferrer    ondansetron (ZOFRAN-ODT) disintegrating tablet 4 mg, 4 mg, Oral, Q8H PRN **OR** ondansetron (ZOFRAN) injection 4 mg, 4 mg, Intravenous, Q6H PRN, KARLY Ferrer    0.9 % sodium chloride infusion, , Intravenous, Continuous, KARLY Rm    famotidine (PEPCID) injection 20 mg, 20 mg, Intravenous, BID, KARLY Rm    fentaNYL (SUBLIMAZE) injection 25 mcg, 25 mcg, Intravenous, Q1H PRN **OR** fentaNYL (SUBLIMAZE) injection 50 mcg, 50 mcg, Intravenous, Q1H PRN, KARLY Ferrer    HYDROmorphone (DILAUDID) injection 0.25 mg, 0.25 mg, Intravenous, Q5 Min PRN, Susi Steele MD    HYDROmorphone (DILAUDID) injection 0.5 mg, 0.5 mg, Intravenous, Q5 Min PRN, Susi Steele MD    fentaNYL (SUBLIMAZE) injection 25 mcg, 25 mcg, Intravenous, Q5 Min PRN, Susi Steele MD    fentaNYL (SUBLIMAZE) injection 50 mcg, 50 mcg, Intravenous, Q5 Min PRN, Susi Steele MD    ondansetron [facility]) injection 4 mg, 4 mg, Intravenous, Once PRN, Calixto Steele MD    labetalol (NORMODYNE;TRANDATE) injection syringe 5 mg, 5 mg, Intravenous, Q10 Min PRN, Calixto Steele MD    promethazine (PHENERGAN) injection 6.25 mg, 6.25 mg, Intramuscular, Once PRN, Calixto Steele MD    meperidine (DEMEROL) injection 12.5 mg, 12.5 mg, Intravenous, Q5 Min PRN, Calixto Steele MD    Current Outpatient Medications:     Magnesium Oxide 600 MG CAPS, Take  by mouth nightly.  , Disp: , Rfl:     Melatonin 3 MG TABS, Take 3 mg by mouth nightly.  , Disp: , Rfl:     MULTIPLE VITAMINS PO, Take  by mouth daily. , Disp: , Rfl:     Cholecalciferol (VITAMIN D) 2000 UNITS TABS, Take 4,000 Units by mouth daily. , Disp: , Rfl:     levothyroxine (SYNTHROID) 125 MCG tablet, Take 100 mcg by mouth daily. , Disp: , Rfl:     latanoprost (XALATAN) 0.005 % ophthalmic solution, 1 drop nightly.  , Disp: , Rfl:       SOCIAL HISTORY     Social History     Social History Narrative    Not on file     Social History     Tobacco Use    Smoking status: Former Smoker    Smokeless tobacco: Never Used   Substance Use Topics    Alcohol use: Not Currently    Drug use: Never         ALLERGIES     Allergies   Allergen Reactions    Optiray [Ioversol] Anaphylaxis     Throat swelling code blue was called and patient also develop hives    Codeine     Dye [Iodides]     Iodine     Pcn [Penicillins]     Sulfa Antibiotics          FAMILY HISTORY     Family History   Problem Relation Age of Onset    Cancer Father         lung         PREVIOUS RECORDS   Previous records reviewed: Past medical, past surgical, medications, allergies. PHYSICAL EXAM     ED Triage Vitals [09/27/20 0018]   BP Temp Temp src Pulse Resp SpO2 Height Weight   107/67 -- -- 86 21 95 % -- --     Initial vital signs and nursing assessment reviewed and normal. Pulsoximetry is normal per my interpretation.     Additional Vital Signs:  Vitals:    09/27/20 0410   BP: 125/83   Pulse: 97   Resp: 22   Temp:    SpO2: SBO.  Plan: Admit to surgery. Dr. Tao Borrero saw patient, and taking her to surgery at this point for management of the incarcerated hernia with small bowel obstruction. Rocephin given for UTI. ED RESULTS   Laboratory results:  Labs Reviewed   COMPREHENSIVE METABOLIC PANEL W/ REFLEX TO MG FOR LOW K - Abnormal; Notable for the following components:       Result Value    Glucose 143 (*)     Total Bilirubin 0.2 (*)     All other components within normal limits   URINE WITH REFLEXED MICRO - Abnormal; Notable for the following components:    Ketones, Urine TRACE (*)     Nitrite, Urine POSITIVE (*)     Leukocyte Esterase, Urine SMALL (*)     Color, UA DK YELLOW (*)     All other components within normal limits   OSMOLALITY - Abnormal; Notable for the following components:    Osmolality Calc 274.2 (*)     All other components within normal limits   CBC WITH AUTO DIFFERENTIAL - Abnormal; Notable for the following components:    RBC 4.09 (*)     RDW-SD 46.6 (*)     Segs Absolute 9.0 (*)     Lymphocytes Absolute 0.9 (*)     All other components within normal limits   CULTURE, REFLEXED, URINE    Narrative:     Source: cath urine       Site: catheter          Current Antibiotics: none   TROPONIN   BRAIN NATRIURETIC PEPTIDE   LIPASE   ANION GAP   GLOMERULAR FILTRATION RATE, ESTIMATED   COVID-19   LACTIC ACID, PLASMA       Radiologic studies results:  XR CHEST PORTABLE   Final Result   Impression:   Transesophageal tube tip in stomach. This document has been electronically signed by: Twan Ignacio MD on    09/27/2020 03:54 AM         CT ABDOMEN PELVIS WO CONTRAST Additional Contrast? None   Final Result      XR CHEST PORTABLE   Final Result   Impression:   No acute disease.       This document has been electronically signed by: Twan Ignacio MD on    09/27/2020 01:27 AM             ED Medications administered this visit:   Medications   0.9 % sodium chloride infusion (1,000 mLs Intravenous New Bag 9/27/20 0045) sodium chloride flush 0.9 % injection 10 mL (has no administration in time range)   sodium chloride flush 0.9 % injection 10 mL (has no administration in time range)   ondansetron (ZOFRAN-ODT) disintegrating tablet 4 mg (has no administration in time range)     Or   ondansetron (ZOFRAN) injection 4 mg (has no administration in time range)   0.9 % sodium chloride infusion (has no administration in time range)   famotidine (PEPCID) injection 20 mg (has no administration in time range)   fentaNYL (SUBLIMAZE) injection 25 mcg (has no administration in time range)     Or   fentaNYL (SUBLIMAZE) injection 50 mcg (has no administration in time range)   HYDROmorphone (DILAUDID) injection 0.25 mg (has no administration in time range)   HYDROmorphone (DILAUDID) injection 0.5 mg (has no administration in time range)   fentaNYL (SUBLIMAZE) injection 25 mcg (has no administration in time range)   fentaNYL (SUBLIMAZE) injection 50 mcg (has no administration in time range)   ondansetron (ZOFRAN) injection 4 mg (has no administration in time range)   labetalol (NORMODYNE;TRANDATE) injection syringe 5 mg (has no administration in time range)   promethazine (PHENERGAN) injection 6.25 mg (has no administration in time range)   meperidine (DEMEROL) injection 12.5 mg (has no administration in time range)   ondansetron (ZOFRAN) injection 4 mg (4 mg Intravenous Given 9/27/20 0045)   morphine injection 4 mg (4 mg Intravenous Given 9/27/20 0045)   fentaNYL (SUBLIMAZE) injection 50 mcg (50 mcg Intravenous Given 9/27/20 0149)   ondansetron (ZOFRAN) injection 4 mg (4 mg Intravenous Given 9/27/20 0149)   fentaNYL (SUBLIMAZE) injection 50 mcg (50 mcg Intravenous Given 9/27/20 0232)   cefTRIAXone (ROCEPHIN) 1 g IVPB in 50 mL D5W minibag (0 g Intravenous Stopped 9/27/20 0412)   ondansetron (ZOFRAN) injection 4 mg (4 mg Intravenous Given 9/27/20 0429)         ED COURSE     ED Course as of Sep 27 0457   Sun Sep 27, 2020   0157 CT ABDOMEN PELVIS WO CONTRAST Additional Contrast? None [EM]   2754 CBC auto differential [EM]      ED Course User Index  [EM] Tony Regan MD       .      MEDICATION CHANGES     New Prescriptions    No medications on file         FINAL DISPOSITION     Final diagnoses:   Incarcerated left inguinal hernia     Condition: condition: stable  Dispo: Admit to operating room      This transcription was electronically signed. Parts of this transcriptions may have been dictated by use of voice recognition software and electronically transcribed, and parts may have been transcribed with the assistance of an ED scribe. The transcription may contain errors not detected in proofreading. Please refer to my supervising physician's documentation if my documentation differs.     Electronically Signed: Tony Regan, 09/27/20, 4:57 AM         Tony Regan MD  Resident  09/27/20 [id number]

## 2020-09-27 NOTE — ED NOTES
Informed consent signed by pt at this time. VS updated. ATB completed.       Nivia Day RN  09/27/20 7844

## 2020-09-28 LAB
ALBUMIN SERPL-MCNC: 3.5 G/DL (ref 3.5–5.1)
ALP BLD-CCNC: 41 U/L (ref 38–126)
ALT SERPL-CCNC: 13 U/L (ref 11–66)
ANION GAP SERPL CALCULATED.3IONS-SCNC: 5 MEQ/L (ref 8–16)
AST SERPL-CCNC: 18 U/L (ref 5–40)
BASOPHILS # BLD: 0.1 %
BASOPHILS ABSOLUTE: 0 THOU/MM3 (ref 0–0.1)
BILIRUB SERPL-MCNC: 0.6 MG/DL (ref 0.3–1.2)
BUN BLDV-MCNC: 8 MG/DL (ref 7–22)
CALCIUM SERPL-MCNC: 8.5 MG/DL (ref 8.5–10.5)
CHLORIDE BLD-SCNC: 104 MEQ/L (ref 98–111)
CO2: 29 MEQ/L (ref 23–33)
CREAT SERPL-MCNC: 0.4 MG/DL (ref 0.4–1.2)
EOSINOPHIL # BLD: 0.1 %
EOSINOPHILS ABSOLUTE: 0 THOU/MM3 (ref 0–0.4)
ERYTHROCYTE [DISTWIDTH] IN BLOOD BY AUTOMATED COUNT: 14.4 % (ref 11.5–14.5)
ERYTHROCYTE [DISTWIDTH] IN BLOOD BY AUTOMATED COUNT: 49.8 FL (ref 35–45)
GFR SERPL CREATININE-BSD FRML MDRD: > 90 ML/MIN/1.73M2
GLUCOSE BLD-MCNC: 106 MG/DL (ref 70–108)
HCT VFR BLD CALC: 36 % (ref 37–47)
HEMOGLOBIN: 11.5 GM/DL (ref 12–16)
IMMATURE GRANS (ABS): 0.02 THOU/MM3 (ref 0–0.07)
IMMATURE GRANULOCYTES: 0.2 %
LYMPHOCYTES # BLD: 16.6 %
LYMPHOCYTES ABSOLUTE: 1.8 THOU/MM3 (ref 1–4.8)
MCH RBC QN AUTO: 30.4 PG (ref 26–33)
MCHC RBC AUTO-ENTMCNC: 31.9 GM/DL (ref 32.2–35.5)
MCV RBC AUTO: 95.2 FL (ref 81–99)
MONOCYTES # BLD: 9.5 %
MONOCYTES ABSOLUTE: 1 THOU/MM3 (ref 0.4–1.3)
NUCLEATED RED BLOOD CELLS: 0 /100 WBC
ORGANISM: ABNORMAL
PLATELET # BLD: 168 THOU/MM3 (ref 130–400)
PMV BLD AUTO: 11.1 FL (ref 9.4–12.4)
POTASSIUM REFLEX MAGNESIUM: 4.3 MEQ/L (ref 3.5–5.2)
RBC # BLD: 3.78 MILL/MM3 (ref 4.2–5.4)
SEG NEUTROPHILS: 73.5 %
SEGMENTED NEUTROPHILS ABSOLUTE COUNT: 8.1 THOU/MM3 (ref 1.8–7.7)
SODIUM BLD-SCNC: 138 MEQ/L (ref 135–145)
TOTAL PROTEIN: 5.9 G/DL (ref 6.1–8)
URINE CULTURE REFLEX: ABNORMAL
WBC # BLD: 11 THOU/MM3 (ref 4.8–10.8)

## 2020-09-28 PROCEDURE — 99024 POSTOP FOLLOW-UP VISIT: CPT | Performed by: SURGERY

## 2020-09-28 PROCEDURE — 2500000003 HC RX 250 WO HCPCS: Performed by: SURGERY

## 2020-09-28 PROCEDURE — 80053 COMPREHEN METABOLIC PANEL: CPT

## 2020-09-28 PROCEDURE — 85025 COMPLETE CBC W/AUTO DIFF WBC: CPT

## 2020-09-28 PROCEDURE — 2580000003 HC RX 258: Performed by: SURGERY

## 2020-09-28 PROCEDURE — 6370000000 HC RX 637 (ALT 250 FOR IP): Performed by: SURGERY

## 2020-09-28 PROCEDURE — 1200000000 HC SEMI PRIVATE

## 2020-09-28 PROCEDURE — 36415 COLL VENOUS BLD VENIPUNCTURE: CPT

## 2020-09-28 PROCEDURE — 6360000002 HC RX W HCPCS: Performed by: SURGERY

## 2020-09-28 RX ORDER — DOCUSATE SODIUM 100 MG/1
100 CAPSULE, LIQUID FILLED ORAL DAILY
Status: DISCONTINUED | OUTPATIENT
Start: 2020-09-28 | End: 2020-09-29 | Stop reason: HOSPADM

## 2020-09-28 RX ORDER — GRANULES FOR ORAL 3 G/1
3 POWDER ORAL ONCE
Status: COMPLETED | OUTPATIENT
Start: 2020-09-28 | End: 2020-09-28

## 2020-09-28 RX ADMIN — MORPHINE SULFATE 4 MG: 4 INJECTION, SOLUTION INTRAMUSCULAR; INTRAVENOUS at 09:12

## 2020-09-28 RX ADMIN — ENOXAPARIN SODIUM 40 MG: 40 INJECTION SUBCUTANEOUS at 08:01

## 2020-09-28 RX ADMIN — MORPHINE SULFATE 4 MG: 4 INJECTION, SOLUTION INTRAMUSCULAR; INTRAVENOUS at 04:51

## 2020-09-28 RX ADMIN — SODIUM CHLORIDE: 9 INJECTION, SOLUTION INTRAVENOUS at 23:03

## 2020-09-28 RX ADMIN — FAMOTIDINE 20 MG: 10 INJECTION INTRAVENOUS at 21:15

## 2020-09-28 RX ADMIN — FAMOTIDINE 20 MG: 10 INJECTION INTRAVENOUS at 08:01

## 2020-09-28 RX ADMIN — SODIUM CHLORIDE: 9 INJECTION, SOLUTION INTRAVENOUS at 04:34

## 2020-09-28 RX ADMIN — FOSFOMYCIN TROMETHAMINE 1 PACKET: 3 POWDER ORAL at 10:09

## 2020-09-28 RX ADMIN — Medication 10 ML: at 08:01

## 2020-09-28 RX ADMIN — DOCUSATE SODIUM 100 MG: 100 CAPSULE, LIQUID FILLED ORAL at 14:25

## 2020-09-28 ASSESSMENT — PAIN DESCRIPTION - FREQUENCY: FREQUENCY: CONTINUOUS

## 2020-09-28 ASSESSMENT — PAIN SCALES - GENERAL
PAINLEVEL_OUTOF10: 7
PAINLEVEL_OUTOF10: 2
PAINLEVEL_OUTOF10: 7

## 2020-09-28 ASSESSMENT — PAIN DESCRIPTION - DESCRIPTORS: DESCRIPTORS: NAGGING

## 2020-09-28 ASSESSMENT — PAIN DESCRIPTION - PROGRESSION: CLINICAL_PROGRESSION: GRADUALLY IMPROVING

## 2020-09-28 ASSESSMENT — PAIN DESCRIPTION - ONSET: ONSET: ON-GOING

## 2020-09-28 ASSESSMENT — PAIN DESCRIPTION - PAIN TYPE: TYPE: SURGICAL PAIN

## 2020-09-28 ASSESSMENT — PAIN DESCRIPTION - LOCATION: LOCATION: ABDOMEN

## 2020-09-28 ASSESSMENT — PAIN DESCRIPTION - ORIENTATION: ORIENTATION: LEFT;LOWER

## 2020-09-28 ASSESSMENT — PAIN - FUNCTIONAL ASSESSMENT: PAIN_FUNCTIONAL_ASSESSMENT: ACTIVITIES ARE NOT PREVENTED

## 2020-09-28 NOTE — CARE COORDINATION
20, 1:27 PM EDT  DISCHARGE PLANNING EVALUATION:    [address] [place] [address]       Admitted from: ED 2020/ [facility] [place] day: 1   Location: [address] [place] [address]A Reason for admit: Inguinal hernia, left [K40.90] Status: IP  Admit order signed?: yes  PMH:  has a past medical history of Asthma, Glaucoma, and Thyroid disease. Procedure:  Open repair of incarcerated femoral hernia with  polypropylene plug to close femoral space by Dr. Antonio Arce. Pertinent abnormal Imagin/27 CT Abd/Pelvis - Moderate grade mechanical small bowel obstruction associated with left inguinal hernia. Medications:  Scheduled Meds:   docusate sodium  100 mg Oral Daily    famotidine (PEPCID) injection  20 mg Intravenous BID    sodium chloride flush  10 mL Intravenous 2 times per day    enoxaparin  40 mg Subcutaneous Daily     Continuous Infusions:   sodium chloride 50 mL/hr at 20 0827      Pertinent Info/Orders/Treatment Plan:  Pt admitted through ED with abdominal pain. Found to have incarcerated femoral hernia. Taken to surgery and had open repair completed. POD #1 today. Full liquid diet. Ambulate. Pain control. Pepcid iv bid. IVF. Diet: DIET FULL LIQUID;   Smoking status:  reports that she has quit smoking. She has never used smokeless tobacco.   PCP: Jeremy Faustin MD  Readmission 30 days or less: No  Readmission Risk Score: 10%    Discharge Planning Evaluation  Current Residence:  Private Residence  Living Arrangements:  Spouse/Significant Other   Support Systems:  Spouse/Significant Other  Current Services PTA:     Potential Assistance Needed:  N/A  Potential Assistance Purchasing Medications:  No  Does patient want to participate in local refill/ meds to beds program?  No  Type of Home Care Services:  None  Patient expects to be discharged to:  Home  Expected Discharge date:  20  Follow Up Appointment: Best Day/ Time: Monday AM    Patient Goals/Plan/Treatment Preferences: Spoke with pt and .  They live at home together. Pt is independent, she drives and states she is out in the garden nearly all day. Denies any DME or HH needs. Transportation/Food Security/Housekeeping Addressed:  No issues identified.     Evaluation: no

## 2020-09-28 NOTE — PLAN OF CARE
Problem: HEMODYNAMIC STATUS  Goal: Patient has stable vital signs and fluid balance  9/28/2020 0210 by Lobo Le RN  Outcome: Ongoing  Note: Patient afebrile and VSS this shift. IV fluids infusing and having adequate urinary output. Problem: OXYGENATION/RESPIRATORY FUNCTION  Goal: Patient will achieve/maintain normal respiratory rate/effort  9/28/2020 0210 by Lobo Le RN  Outcome: Ongoing  Note: Resp rate/effort within normal range this shift. Resps even and unlabored. Problem: MOBILITY  Goal: Early mobilization is achieved  9/28/2020 0210 by Lobo Le RN  Outcome: Ongoing  Note: Patient up to bathroom and walking matos this shift. Tolerated well. Problem: ELIMINATION  Goal: Elimination patterns are normal or improving  Description: Elimination patterns return to pre-surgery normal patterns  9/28/2020 0210 by Lobo Le RN  Outcome: Ongoing     Problem: SKIN INTEGRITY  Goal: Skin integrity is maintained or improved  9/28/2020 0210 by Lobo Le RN  Outcome: Ongoing  Note: Surgical site clean, dry and intact this shift. Open to air. Applying ice for pain at site. Care plan reviewed with patient. Patient verbalizes understanding of the plan of care and contributes to goal setting.

## 2020-09-28 NOTE — PROGRESS NOTES
[facility]  Postoperative Progress Note    Pt Name: Steven Funk Record Number: [id number]  Date of Birth 1945   Today's Date: 9/28/2020    Diego Romo is doing well. She wants couple coffee. Passing flatus no bowel movement. Denies nausea. Tolerated NG tube out. OBJECTIVE  VITALS: VITALS:  BP (!) 91/57   Pulse 77   Temp 98.4 °F (36.9 °C) (Oral)   Resp 20   SpO2 94%   GENERAL: alert, cooperative, no acute distress  LUNGS: clear to ausculation, without wheezes, rales or rhonci  HEART: normal rate  ABDOMEN: soft, nontender, bowel sounds present in all 4 quadrants, no distension  INCISION: clean, dry and intact  EXTERMITY: no cyanosis or edema  INTAKE/OUTPUT :   INTAKE/OUTPUT:    Intake/Output Summary (Last 24 hours) at 9/28/2020 1227  Last data filed at 9/28/2020 0524  Gross per 24 hour   Intake 3039.62 ml   Output 1030 ml   Net 2009.62 ml        LABS  CBC with Differential:    Lab Results   Component Value Date    WBC 11.0 09/28/2020    RBC 3.78 09/28/2020    RBC 3.64 06/02/2012    HGB 11.5 09/28/2020    HCT 36.0 09/28/2020     09/28/2020    MCV 95.2 09/28/2020    MCH 30.4 09/28/2020    MCHC 31.9 09/28/2020    RDW 13.9 06/02/2012    NRBC 0 09/28/2020    NRBC 0 05/07/2012    SEGSPCT 73.5 09/28/2020    MONOPCT 9.5 09/28/2020    MONOSABS 1.0 09/28/2020    LYMPHSABS 1.8 09/28/2020    EOSABS 0.0 09/28/2020    BASOSABS 0.0 09/28/2020     BMP:    Lab Results   Component Value Date     09/28/2020    K 4.3 09/28/2020     09/28/2020    CO2 29 09/28/2020    BUN 8 09/28/2020    LABALBU 3.5 09/28/2020    CREATININE 0.4 09/28/2020    CALCIUM 8.5 09/28/2020    LABGLOM >90 09/28/2020    GLUCOSE 106 09/28/2020    GLUCOSE 147 05/08/2012             ASSESSMENT  1. POD # 1 Incarcerated left femoral inguinal hernia  2. UTI present on admission    PLAN  1. start oral intake  2. Antibiotic should cover UTI as given.   Fosfomycin ordered 1 dose  3. If tolerates diet plan home next 24 hours. 4.  Lovenox VTE prophylaxis.   Mattie Almeida MD  Electronically signed 9/28/2020 at 7:18 AM

## 2020-09-29 ENCOUNTER — TELEPHONE (OUTPATIENT)
Dept: SURGERY | Age: 75
End: 2020-09-29

## 2020-09-29 VITALS
SYSTOLIC BLOOD PRESSURE: 119 MMHG | DIASTOLIC BLOOD PRESSURE: 88 MMHG | BODY MASS INDEX: 31.64 KG/M2 | RESPIRATION RATE: 16 BRPM | WEIGHT: 193.1 LBS | HEART RATE: 82 BPM | TEMPERATURE: 97.8 F | OXYGEN SATURATION: 98 %

## 2020-09-29 PROBLEM — N30.00 ACUTE CYSTITIS WITHOUT HEMATURIA: Status: ACTIVE | Noted: 2020-09-29

## 2020-09-29 PROCEDURE — 99024 POSTOP FOLLOW-UP VISIT: CPT | Performed by: SURGERY

## 2020-09-29 RX ORDER — TRAMADOL HYDROCHLORIDE 50 MG/1
50 TABLET ORAL EVERY 6 HOURS PRN
Qty: 20 TABLET | Refills: 0 | Status: SHIPPED | OUTPATIENT
Start: 2020-09-29 | End: 2020-10-04

## 2020-09-29 RX ORDER — TRAMADOL HYDROCHLORIDE 50 MG/1
50 TABLET ORAL EVERY 4 HOURS PRN
Qty: 18 TABLET | Refills: 0 | Status: SHIPPED | OUTPATIENT
Start: 2020-09-29 | End: 2020-10-02

## 2020-09-29 RX ORDER — TRAMADOL HYDROCHLORIDE 50 MG/1
50 TABLET ORAL EVERY 6 HOURS PRN
Status: DISCONTINUED | OUTPATIENT
Start: 2020-09-29 | End: 2020-09-29 | Stop reason: HOSPADM

## 2020-09-29 ASSESSMENT — PAIN SCALES - GENERAL
PAINLEVEL_OUTOF10: 0
PAINLEVEL_OUTOF10: 0

## 2020-09-29 NOTE — DISCHARGE SUMMARY
Discharge Summary     Patient Identification:  Annamary Curling  : 1945  MRN: [id number]   Account: [de-identified]     Admit date: 2020  Discharge date: 2020     Attending provider: No att. providers found        Primary care provider: Temo Fisher MD     Discharge Diagnoses:   Principal Problem:    Incarcerated left inguinal hernia  Active Problems:    Acute cystitis without hematuria  Resolved Problems:    * No resolved hospital problems. [facility] Course:   Annamary Curling is a 70 y.o. female admitted to [facility] on 2020 for evaluation of nausea vomiting and abdominal pain. She was found to have an incarcerated left inguinal femoral hernia. She was taken urgently to surgery. She had a COVID-19 screen which was negative. She had a incarcerated left inguinal hernia which was mostly omentum there was a knuckle of bowel at the back of it it appeared viable. She underwent a plug and patch repair and has done well since. She is tolerating oral intake and having GI function. She has small ventral incisional hernias which are of no consequence at present. .          Discharge Medications:   Laureen Kim \"Cristal\"   Home Medication Instructions MPK:[id number]    Printed on:20 0810   Medication Information                      Cholecalciferol (VITAMIN D) 2000 UNITS TABS  Take 4,000 Units by mouth daily. latanoprost (XALATAN) 0.005 % ophthalmic solution  1 drop nightly. levothyroxine (SYNTHROID) 125 MCG tablet  Take 100 mcg by mouth daily. Magnesium Oxide 600 MG CAPS  Take  by mouth nightly. Melatonin 3 MG TABS  Take 3 mg by mouth nightly. MULTIPLE VITAMINS PO  Take  by mouth daily. traMADol (ULTRAM) 50 MG tablet  Take 1 tablet by mouth every 6 hours as needed for Pain for up to 5 days.              traMADol (ULTRAM) 50 MG tablet  Take 1 tablet by mouth every 4 hours as needed for Pain for up to 3 days. Intended supply: 3 days. Take lowest dose possible to manage pain                 Patient Instructions:    Discharge lab work: None  Activity: As tolerated no lifting over 20 pounds  Diet: No diet orders on file    Code Status: Prior    Follow-up visits:   Dexter Mg MD  [address] [place], [place] [phone]    Schedule an appointment as soon as possible for a visit in 2 weeks  post op incarcerated inguinal hernia       Procedures: Repair incarcerated inguinal femoral hernia  Examination:  Vitals:  Vitals:    09/28/20 2110 09/28/20 2300 09/29/20 0132 09/29/20 0300   BP: (!) 125/51 106/77 103/63 119/88   Pulse: 93 87 88 82   Resp: 20 20 18 16   Temp: 98.8 °F (37.1 °C) 98.1 °F (36.7 °C) 98 °F (36.7 °C) 97.8 °F (36.6 °C)   TempSrc: Oral Oral Oral Oral   SpO2: 91% 93% 94% 98%   Weight:    193 lb 1.6 oz (87.6 kg)     Weight: Weight: 193 lb 1.6 oz (87.6 kg)     24 hour intake/output:    Intake/Output Summary (Last 24 hours) at 9/29/2020 1756  Last data filed at 9/29/2020 0300  Gross per 24 hour   Intake 751.39 ml   Output 1300 ml   Net -548.61 ml       General appearance -alert no distress  Chest -clear  Heart -normal rate  Abdomen -soft nontender incision intact  Incision clean dry and intact  Significant Diagnostics:   Radiology: Ct Abdomen Pelvis Wo Contrast Additional Contrast? None    Result Date: 9/27/2020  CT ABDOMEN PELVIS WO CONTRAST Exam Date and Exam Time: 09/27/2020 01:05 AM Accession: [id number] Reason for exam: abd pain Ordering Diagnosis: Chest Pain, Abdominal Pain, EmesisADDENDUM #1  This report was discussed with Reba Bonilla RN on Sep 27, 2020 01:47:00 EDT. This document has been electronically signed by: Janes Mace on 09/27/2020 01:47 AM  ORIGINAL REPORT  CT Abdomen Pelvis WITHOUT IV contrast, with multiplanar reconstructions Comparison:  CT  - CT ABDOMEN WO CONTR  - 04/20/2007 02:27 AM EDT Findings:  . Liver unremarkable. No calcified gallstones. No biliary duct dilation. Pancreas unremarkable. Spleen unremarkable. Adrenals unremarkable. Kidneys unremarkable. No nephrolithiasis. No hydronephrosis. No ureteral lithiasis. No bladder calculi. Reproductive anatomic structures unremarkable as visualized. Mild small bowel dilation in left abdomen and pelvis. Dilated small bowel loop in left inguinal hernia. Transition zone in small bowel caliber related to the hernia 4 cm periampullary duodenal diverticulum. Moderate diverticulosis. Small sliding hiatal hernia. Appendix unremarkable as visualized. Multicompartment ventral abdominal wall fat-containing hernia No adenopathy. No aortic aneurysm. No free air. No significant free fluid. No acute fracture. No basilar infiltrate. COPD. Impression: Moderate grade mechanical small bowel obstruction associated with left inguinal hernia. New since prior This document has been electronically signed by: Alonzo Zaragoza MD on 09/27/2020 01:45 AM All CT scans at this facility use dose modulation, iterative reconstruction, and/or weight-based dosing when appropriate to reduce radiation dose to as low as reasonably achievable. Xr Chest Portable    Result Date: 9/27/2020  XR CHEST PORTABLE Exam Date and Exam Time: 09/27/2020 03:07 AM Accession: [id number] Reason for exam: NG placement Ordering Diagnosis: Chest Pain, Abdominal Pain, Emesis Chest xray 1 view Comparison:  CR,SR  - XR CHEST PORTABLE  - 09/27/2020 12:44 AM EDT Findings: Cardiomediastinal silhouette unremarkable. No mediastinal shift. No consolidation. No lung nodule. Calcified granulomas in bilateral lungs. No pleural effusion or pneumothorax. No acute fracture. Impression: Transesophageal tube tip in stomach.  This document has been electronically signed by: Alonzo Zaragoza MD on 09/27/2020 03:54 AM     Xr Chest Portable    Result Date: 9/27/2020  XR CHEST PORTABLE Exam Date and Exam Time: 09/27/2020 12:44 AM Accession: [id number] Reason for exam: chest pain Ordering Diagnosis: Chest Pain, Abdominal Pain, Emesis Chest xray 1 view Comparison:  DX  - CHEST MOBILE SINGLE  - 05/07/2012 08:41 AM EDT Findings: Cardiomediastinal silhouette unremarkable. No mediastinal shift. No consolidation. No lung nodule. Scattered calcified lung granulomas. No pleural effusion or pneumothorax. No acute fracture. Impression: No acute disease.  This document has been electronically signed by: Kim Keita MD on 09/27/2020 01:27 AM       Labs:   Recent Results (from the past 72 hour(s))   EKG 12 Lead    Collection Time: 09/27/20 12:20 AM   Result Value Ref Range    Ventricular Rate 82 BPM    Atrial Rate 82 BPM    P-R Interval 198 ms    QRS Duration 74 ms    Q-T Interval 388 ms    QTc Calculation (Bazett) 453 ms    P Axis 78 degrees    R Axis 61 degrees    T Axis 80 degrees   Comprehensive Metabolic Panel w/ Reflex to MG    Collection Time: 09/27/20 12:40 AM   Result Value Ref Range    Glucose 143 (H) 70 - 108 mg/dL    CREATININE 0.4 0.4 - 1.2 mg/dL    BUN 12 7 - 22 mg/dL    Sodium 136 135 - 145 meq/L    Potassium reflex Magnesium 3.7 3.5 - 5.2 meq/L    Chloride 99 98 - 111 meq/L    CO2 27 23 - 33 meq/L    Calcium 9.1 8.5 - 10.5 mg/dL    AST 20 5 - 40 U/L    Alkaline Phosphatase 52 38 - 126 U/L    Total Protein 7.0 6.1 - 8.0 g/dL    Alb 4.1 3.5 - 5.1 g/dL    Total Bilirubin 0.2 (L) 0.3 - 1.2 mg/dL    ALT 16 11 - 66 U/L   Troponin    Collection Time: 09/27/20 12:40 AM   Result Value Ref Range    Troponin T < 0.010 ng/ml   Brain Natriuretic Peptide    Collection Time: 09/27/20 12:40 AM   Result Value Ref Range    Pro-BNP 67.7 0.0 - 900.0 pg/mL   Lipase    Collection Time: 09/27/20 12:40 AM   Result Value Ref Range    Lipase 27.2 5.6 - 51.3 U/L   Urine with Reflexed Micro    Collection Time: 09/27/20 12:40 AM   Result Value Ref Range    Glucose, Ur NEGATIVE NEGATIVE mg/dl    Bilirubin Urine NEGATIVE NEGATIVE    Ketones, Urine TRACE (A) NEGATIVE    Specific [place], Urine 1.023 1.002 - 1.030    Blood, Urine NEGATIVE NEGATIVE    pH, UA 6.0 5.0 - 9.0    Protein, UA NEGATIVE NEGATIVE    Urobilinogen, Urine 1.0 0.0 - 1.0 eu/dl    Nitrite, Urine POSITIVE (A) NEGATIVE    Leukocyte Esterase, Urine SMALL (A) NEGATIVE    Color, UA DK YELLOW (A) STRAW-YELLOW    Character, Urine CLEAR CLEAR-SL CLOUD    RBC, UA 0-2 0-2/hpf /hpf    WBC, UA 10-15 0-4/hpf /hpf    Epithelial Cells, UA 3-5 3-5/hpf /hpf    Bacteria, UA MANY FEW/NONE SEEN /hpf    Casts UA 8-15 HYALINE NONE SEEN /lpf    Crystals, UA NONE SEEN NONE SEEN    Renal Epithelial, UA NONE SEEN NONE SEEN    Yeast, UA NONE SEEN NONE SEEN    CASTS 2 NONE SEEN NONE SEEN /lpf    MISCELLANEOUS 2 NONE SEEN    Culture, Reflexed, Urine    Collection Time: 09/27/20 12:40 AM    Specimen: Urine, catheter   Result Value Ref Range    Organism Escherichia coli (A)     Urine Culture Reflex [place] count: >100,000 CFU/mL         Susceptibility    Escherichia coli - BACTERIAL SUSCEPTIBILITY PANEL BY BRENDAN     amoxicillin-clavulanate <=2 Sensitive mcg/mL     cefOXitin <=4 Sensitive mcg/mL     cefTRIAXone <=1 Sensitive mcg/mL     ampicillin <=2 Sensitive mcg/mL     gentamicin <=1 Sensitive mcg/mL     trimethoprim-sulfamethoxazole <=20 Sensitive mcg/mL     tetracycline <=1 Sensitive mcg/mL     nitrofurantoin <=16 Sensitive mcg/mL   Anion Gap    Collection Time: 09/27/20 12:40 AM   Result Value Ref Range    Anion Gap 10.0 8.0 - 16.0 meq/L   Glomerular Filtration Rate, Estimated    Collection Time: 09/27/20 12:40 AM   Result Value Ref Range    Est, Glom Filt Rate >90 ml/min/1.73m2   Osmolality    Collection Time: 09/27/20 12:40 AM   Result Value Ref Range    Osmolality Calc 274.2 (L) 275.0 - 300.0 mOsmol/kg   CBC auto differential    Collection Time: 09/27/20  3:00 AM   Result Value Ref Range    WBC 10.4 4.8 - 10.8 thou/mm3    RBC 4.09 (L) 4.20 - 5.40 mill/mm3    Hemoglobin 12.5 12.0 - 16.0 gm/dl    Hematocrit 37.5 37.0 - 47.0 %    MCV 91.7 81.0 - 99.0 fL    MCH 30.6 4.8 thou/mm3    Monocytes Absolute 1.0 0.4 - 1.3 thou/mm3    Eosinophils Absolute 0.0 0.0 - 0.4 thou/mm3    Basophils Absolute 0.0 0.0 - 0.1 thou/mm3    Immature Grans (Abs) 0.02 0.00 - 0.07 thou/mm3    nRBC 0 /100 wbc   Anion Gap    Collection Time: 09/28/20  5:09 AM   Result Value Ref Range    Anion Gap 5.0 (L) 8.0 - 16.0 meq/L   Glomerular Filtration Rate, Estimated    Collection Time: 09/28/20  5:09 AM   Result Value Ref Range    Est, Glom Filt Rate >90 ml/min/1.73m2       Discharge condition: good  Disposition: Home      Electronically signed by Alicia Chatterjee MD on 9/29/2020 at 5:56 PM

## 2020-09-29 NOTE — PLAN OF CARE
Problem: OXYGENATION/RESPIRATORY FUNCTION  Goal: Patient will achieve/maintain normal respiratory rate/effort  9/29/2020 0020 by Sharon Ybarra RN  Outcome: Met This Shift  Note: Pt had no complaints of SOB this shift and no oxygen requirements. 9/28/2020 1430 by Demetria Parra RN  Outcome: Ongoing  Note: Patient on room air this shift. Problem: HEMODYNAMIC STATUS  Goal: Patient has stable vital signs and fluid balance  9/29/2020 0020 by Sharon Ybarra RN  Outcome: Ongoing  9/28/2020 1430 by Demetria Parra RN  Outcome: Ongoing     Problem: MOBILITY  Goal: Early mobilization is achieved  9/29/2020 0020 by Sharon Ybarra RN  Outcome: Ongoing  Note: Pt up to bathroom without issue and ambulating around unit without issue. 9/28/2020 1430 by Demetria Parra RN  Outcome: Ongoing  Note: Patient ambulating in room this shift. Problem: ELIMINATION  Goal: Elimination patterns are normal or improving  Description: Elimination patterns return to pre-surgery normal patterns  9/28/2020 1430 by Demetria Parra RN  Outcome: Ongoing  Note: Patient urinating well this shift. Problem: SKIN INTEGRITY  Goal: Skin integrity is maintained or improved  9/29/2020 0020 by Sharon Ybarra RN  Outcome: Ongoing  9/28/2020 1430 by Demetria Parra RN  Outcome: Ongoing     Problem: Pain:  Goal: Pain level will decrease  Description: Pain level will decrease  9/29/2020 0020 by Sharon Ybarra RN  Outcome: Ongoing  Note: No pain medication needed. 9/28/2020 1430 by Demetria Parra RN  Outcome: Ongoing  Note: Patient taking PRN Morphine for pain this shift. Goal: Control of acute pain  Description: Control of acute pain  9/29/2020 0020 by Sharon Ybarra RN  Outcome: Ongoing  9/28/2020 1430 by Demetria Parra RN  Outcome: Ongoing  Goal: Control of chronic pain  Description: Control of chronic pain  9/28/2020 1430 by Demetria Parra RN  Outcome: Ongoing    Care plan reviewed with patient.   Patient verbalize understanding of the plan of care and contribute to goal setting.

## 2020-09-29 NOTE — PROGRESS NOTES
Patient given discharge instructions, no questions or concerns at this time. Patient instructed to call Dr. Mika Johnson office in AM to make 2 week follow up appointment. Patient instructed to  medication from [address] on [facility]. Patient denies any questions or concerns at this time. Patient discharged via wheelchair with  and patient belongings.

## 2020-09-29 NOTE — CARE COORDINATION
9/29/20, 2:44 PM EDT    Patient goals/plan/ treatment preferences discussed by  and . Patient goals/plan/ treatment preferences reviewed with patient/ family. Patient/ family verbalize understanding of discharge plan and are in agreement with goal/plan/treatment preferences. Understanding was demonstrated using the teach back method. AVS provided by RN at time of discharge, which includes all necessary medical information pertaining to the patients current course of illness, treatment, post-discharge goals of care, and treatment preferences. IMM Letter  IMM Letter given to Patient/Family/Significant other/Guardian/POA/by[de-identified] Pt Access  IMM Letter date given[de-identified] 09/27/20  IMM Letter time given[de-identified] 0400       Pt was discharged home, no needs.      Electronically signed by Michael Pak RN on 9/29/2020 at 2:45 PM

## 2020-09-30 ENCOUNTER — TELEPHONE (OUTPATIENT)
Dept: SURGERY | Age: 75
End: 2020-09-30

## 2020-09-30 RX ORDER — ONDANSETRON 4 MG/1
4 TABLET, FILM COATED ORAL EVERY 8 HOURS PRN
Qty: 20 TABLET | Refills: 0 | Status: SHIPPED | OUTPATIENT
Start: 2020-09-30 | End: 2022-05-02 | Stop reason: ALTCHOICE

## 2020-10-11 NOTE — PROGRESS NOTES
Darvni Babb MD   General Surgery  Postprocedure Evaluation in Office  Pt Name: Olivia Vides  Date of Birth 1945   Today's Date: 10/12/2020  Medical Record Number: [id number]  Primary Care Provider: Chhaya Ferguson MD  Chief Complaint   Patient presents with   Lisa Schreiber Post-Op Check     s/p Open repair of incarcerated femoral hernia 9/27/20     ASSESSMENT      1. Acute cystitis without hematuria    2. Incarcerated left inguinal hernia    3. Encounter for postoperative care         PLAN       1. Continue Benadryl and topical creams for pruritus. 2.  Offered Medrol Dosepak but patient is allergic to prednisone. 3.  Discussed with patient surgical site is healing well. She has has no GI or urinary issues  4. Repeat urinalysis    5. Follow-up for recheck in office in 2 months before she goes [place] for the winter. Call in interim with any questions or concerns. Jose Mackenzie is seen today for post-op follow-up. She is status post emergency surgery for an incarcerated left groin hernia. She had an incarcerated inguinal femoral hernia which was repaired with polypropylene plug. She has no nausea vomiting or urinary symptoms she is eating well and has normal GI function. Her biggest complaint is itching all over since surgery. She has no real rash. She states symptoms are improved with Benadryl. She has seen her primary care provider regarding this Dr. Alma Chung. Medications    Current Outpatient Medications:     ondansetron (ZOFRAN) 4 MG tablet, Take 1 tablet by mouth every 8 hours as needed for Nausea or Vomiting, Disp: 20 tablet, Rfl: 0    Magnesium Oxide 600 MG CAPS, Take  by mouth nightly.  , Disp: , Rfl:     Melatonin 3 MG TABS, Take 3 mg by mouth nightly.  , Disp: , Rfl:     MULTIPLE VITAMINS PO, Take  by mouth daily. , Disp: , Rfl:     Cholecalciferol (VITAMIN D) 2000 UNITS TABS, Take 4,000 Units by mouth daily. , Disp: , Rfl:     levothyroxine (SYNTHROID) 125 MCG tablet, Take 100 mcg by mouth daily. , Disp: , Rfl:     latanoprost (XALATAN) 0.005 % ophthalmic solution, 1 drop nightly.  , Disp: , Rfl:     Allergies  Allergies   Allergen Reactions    Optiray [Ioversol] Anaphylaxis     Throat swelling code blue was called and patient also develop hives    Codeine     Dye [Iodides]     Iodine     Pcn [Penicillins]     Sulfa Antibiotics        Review of Systems  History obtained from the patient. Constitutional: Denies any fever, chills, fatigue. Wound: Denies any rash, complains of pruritus since surgery  Resp: Denies any cough, shortness of breath. CV: Denies any chest pain, orthopnea or syncope. GI: Positive for incisional discomfort only. Denies any nausea, vomiting, blood in the stool, constipation or diarrhea. : Denies any hematuria, hesitancy or dysuria. Treated for yeast infection. No urinary symptoms. OBJECTIVE     VITALS: /76 (Site: Right Upper Arm, Position: Sitting, Cuff Size: Medium Adult)   Pulse 73   Temp 97.8 °F (36.6 °C) (Tympanic)   Resp 15   Ht 5' 6\" (1.676 m)   Wt 193 lb 3.2 oz (87.6 kg)   SpO2 98%   BMI 31.18 kg/m²     CONSTITUTIONAL: Alert and oriented times 3, no acute distress and cooperative to examination. SKIN: Skin color, texture, turgor normal. No rashes or lesions. INCISION: wound margins intact and healing well. No signs of infection. No drainage. LUNGS: Lungs Clear  CARDIOVASCULAR: Normal Rate  ABDOMEN: soft, nontender, nondistended, no masses or organomegaly. Groin incision intact normal hypertrophic ridge of tissue postsurgical.  No evidence of recurrent hernia.   NEUROLOGIC: No sensory or motor nerve irritation

## 2020-10-12 ENCOUNTER — OFFICE VISIT (OUTPATIENT)
Dept: SURGERY | Age: 75
End: 2020-10-12

## 2020-10-12 VITALS
HEIGHT: 66 IN | SYSTOLIC BLOOD PRESSURE: 115 MMHG | TEMPERATURE: 97.8 F | RESPIRATION RATE: 15 BRPM | HEART RATE: 73 BPM | WEIGHT: 193.2 LBS | BODY MASS INDEX: 31.05 KG/M2 | OXYGEN SATURATION: 98 % | DIASTOLIC BLOOD PRESSURE: 76 MMHG

## 2020-10-12 PROCEDURE — 99024 POSTOP FOLLOW-UP VISIT: CPT | Performed by: SURGERY

## 2020-10-16 LAB
APPEARANCE: CLEAR
BILIRUBIN: NEGATIVE
COLOR: YELLOW
GLUCOSE BLD-MCNC: NEGATIVE MG/DL
KETONES, URINE: NEGATIVE MG/DL
LEUKOCYTE ESTERASE, URINE: NEGATIVE
NITRITE, URINE: NEGATIVE
OCCULT BLOOD,URINE: NEGATIVE
PH: 6.5 (ref 5–8.5)
PROTEIN, URINE: NEGATIVE MG/DL
SITE/TYPE: NORMAL
SP GRAVITY MISCELLANEOUS: 1.01 (ref 1–1.03)
UROBILINOGEN, URINE: <1.1 EU/DL

## 2020-11-21 ENCOUNTER — HOSPITAL ENCOUNTER (INPATIENT)
Age: 75
LOS: 3 days | Discharge: HOME | DRG: 378 | End: 2020-11-24
Attending: EMERGENCY MEDICINE | Admitting: INTERNAL MEDICINE
Payer: MEDICARE

## 2020-11-21 ENCOUNTER — APPOINTMENT (OUTPATIENT)
Dept: CT IMAGING | Age: 75
DRG: 378 | End: 2020-11-21
Payer: MEDICARE

## 2020-11-21 ENCOUNTER — APPOINTMENT (OUTPATIENT)
Dept: GENERAL RADIOLOGY | Age: 75
DRG: 378 | End: 2020-11-21
Payer: MEDICARE

## 2020-11-21 PROBLEM — K92.2 GI BLEED: Status: ACTIVE | Noted: 2020-11-21

## 2020-11-21 LAB
ABO: NORMAL
ALBUMIN SERPL-MCNC: 3.6 G/DL (ref 3.5–5.1)
ALP BLD-CCNC: 42 U/L (ref 38–126)
ALT SERPL-CCNC: 16 U/L (ref 11–66)
ANION GAP SERPL CALCULATED.3IONS-SCNC: 9 MEQ/L (ref 8–16)
ANTIBODY SCREEN: NORMAL
AST SERPL-CCNC: 19 U/L (ref 5–40)
BASOPHILS # BLD: 0.1 %
BASOPHILS ABSOLUTE: 0 THOU/MM3 (ref 0–0.1)
BILIRUB SERPL-MCNC: 0.2 MG/DL (ref 0.3–1.2)
BILIRUBIN DIRECT: < 0.2 MG/DL (ref 0–0.3)
BUN BLDV-MCNC: 29 MG/DL (ref 7–22)
CALCIUM SERPL-MCNC: 8.8 MG/DL (ref 8.5–10.5)
CHLORIDE BLD-SCNC: 103 MEQ/L (ref 98–111)
CO2: 25 MEQ/L (ref 23–33)
CREAT SERPL-MCNC: 0.3 MG/DL (ref 0.4–1.2)
D-DIMER QUANTITATIVE: 1378 NG/ML FEU (ref 0–500)
EKG ATRIAL RATE: 120 BPM
EKG Q-T INTERVAL: 406 MS
EKG QRS DURATION: 70 MS
EKG QTC CALCULATION (BAZETT): 578 MS
EKG R AXIS: 60 DEGREES
EKG T AXIS: 86 DEGREES
EKG VENTRICULAR RATE: 122 BPM
EOSINOPHIL # BLD: 0.4 %
EOSINOPHILS ABSOLUTE: 0 THOU/MM3 (ref 0–0.4)
ERYTHROCYTE [DISTWIDTH] IN BLOOD BY AUTOMATED COUNT: 13.4 % (ref 11.5–14.5)
ERYTHROCYTE [DISTWIDTH] IN BLOOD BY AUTOMATED COUNT: 45.8 FL (ref 35–45)
GFR SERPL CREATININE-BSD FRML MDRD: > 90 ML/MIN/1.73M2
GLUCOSE BLD-MCNC: 110 MG/DL (ref 70–108)
HCT VFR BLD CALC: 24.6 % (ref 37–47)
HCT VFR BLD CALC: 30.4 % (ref 37–47)
HEMOCCULT STL QL: POSITIVE
HEMOGLOBIN: 7.7 GM/DL (ref 12–16)
HEMOGLOBIN: 9.8 GM/DL (ref 12–16)
IMMATURE GRANS (ABS): 0.03 THOU/MM3 (ref 0–0.07)
IMMATURE GRANULOCYTES: 0.4 %
LYMPHOCYTES # BLD: 25.2 %
LYMPHOCYTES ABSOLUTE: 1.8 THOU/MM3 (ref 1–4.8)
MCH RBC QN AUTO: 30.2 PG (ref 26–33)
MCHC RBC AUTO-ENTMCNC: 32.2 GM/DL (ref 32.2–35.5)
MCV RBC AUTO: 93.8 FL (ref 81–99)
MONOCYTES # BLD: 8.3 %
MONOCYTES ABSOLUTE: 0.6 THOU/MM3 (ref 0.4–1.3)
NUCLEATED RED BLOOD CELLS: 0 /100 WBC
OSMOLALITY CALCULATION: 280.3 MOSMOL/KG (ref 275–300)
PLATELET # BLD: 188 THOU/MM3 (ref 130–400)
PMV BLD AUTO: 11.2 FL (ref 9.4–12.4)
POTASSIUM REFLEX MAGNESIUM: 4.4 MEQ/L (ref 3.5–5.2)
PRO-BNP: 31 PG/ML (ref 0–900)
RBC # BLD: 3.24 MILL/MM3 (ref 4.2–5.4)
RH FACTOR: NORMAL
SARS-COV-2, NAAT: NOT DETECTED
SEG NEUTROPHILS: 65.6 %
SEGMENTED NEUTROPHILS ABSOLUTE COUNT: 4.7 THOU/MM3 (ref 1.8–7.7)
SODIUM BLD-SCNC: 137 MEQ/L (ref 135–145)
TOTAL PROTEIN: 5.5 G/DL (ref 6.1–8)
TROPONIN T: < 0.01 NG/ML
WBC # BLD: 7.2 THOU/MM3 (ref 4.8–10.8)

## 2020-11-21 PROCEDURE — 6360000002 HC RX W HCPCS: Performed by: INTERNAL MEDICINE

## 2020-11-21 PROCEDURE — 86900 BLOOD TYPING SEROLOGIC ABO: CPT

## 2020-11-21 PROCEDURE — 96375 TX/PRO/DX INJ NEW DRUG ADDON: CPT

## 2020-11-21 PROCEDURE — 80048 BASIC METABOLIC PNL TOTAL CA: CPT

## 2020-11-21 PROCEDURE — 96374 THER/PROPH/DIAG INJ IV PUSH: CPT

## 2020-11-21 PROCEDURE — 85379 FIBRIN DEGRADATION QUANT: CPT

## 2020-11-21 PROCEDURE — 6370000000 HC RX 637 (ALT 250 FOR IP): Performed by: STUDENT IN AN ORGANIZED HEALTH CARE EDUCATION/TRAINING PROGRAM

## 2020-11-21 PROCEDURE — 86901 BLOOD TYPING SEROLOGIC RH(D): CPT

## 2020-11-21 PROCEDURE — 80076 HEPATIC FUNCTION PANEL: CPT

## 2020-11-21 PROCEDURE — 85014 HEMATOCRIT: CPT

## 2020-11-21 PROCEDURE — 93005 ELECTROCARDIOGRAM TRACING: CPT | Performed by: EMERGENCY MEDICINE

## 2020-11-21 PROCEDURE — 2060000000 HC ICU INTERMEDIATE R&B

## 2020-11-21 PROCEDURE — 71045 X-RAY EXAM CHEST 1 VIEW: CPT

## 2020-11-21 PROCEDURE — 86850 RBC ANTIBODY SCREEN: CPT

## 2020-11-21 PROCEDURE — P9016 RBC LEUKOCYTES REDUCED: HCPCS

## 2020-11-21 PROCEDURE — C9113 INJ PANTOPRAZOLE SODIUM, VIA: HCPCS

## 2020-11-21 PROCEDURE — C9113 INJ PANTOPRAZOLE SODIUM, VIA: HCPCS | Performed by: INTERNAL MEDICINE

## 2020-11-21 PROCEDURE — 84484 ASSAY OF TROPONIN QUANT: CPT

## 2020-11-21 PROCEDURE — 85018 HEMOGLOBIN: CPT

## 2020-11-21 PROCEDURE — 36430 TRANSFUSION BLD/BLD COMPNT: CPT

## 2020-11-21 PROCEDURE — 2580000003 HC RX 258: Performed by: INTERNAL MEDICINE

## 2020-11-21 PROCEDURE — 82272 OCCULT BLD FECES 1-3 TESTS: CPT

## 2020-11-21 PROCEDURE — 99223 1ST HOSP IP/OBS HIGH 75: CPT | Performed by: INTERNAL MEDICINE

## 2020-11-21 PROCEDURE — 85025 COMPLETE CBC W/AUTO DIFF WBC: CPT

## 2020-11-21 PROCEDURE — U0002 COVID-19 LAB TEST NON-CDC: HCPCS

## 2020-11-21 PROCEDURE — 99285 EMERGENCY DEPT VISIT HI MDM: CPT

## 2020-11-21 PROCEDURE — 6360000002 HC RX W HCPCS

## 2020-11-21 PROCEDURE — 86923 COMPATIBILITY TEST ELECTRIC: CPT

## 2020-11-21 PROCEDURE — 70450 CT HEAD/BRAIN W/O DYE: CPT

## 2020-11-21 PROCEDURE — 36415 COLL VENOUS BLD VENIPUNCTURE: CPT

## 2020-11-21 PROCEDURE — 74176 CT ABD & PELVIS W/O CONTRAST: CPT

## 2020-11-21 PROCEDURE — 83880 ASSAY OF NATRIURETIC PEPTIDE: CPT

## 2020-11-21 PROCEDURE — 6360000002 HC RX W HCPCS: Performed by: EMERGENCY MEDICINE

## 2020-11-21 RX ORDER — PROMETHAZINE HYDROCHLORIDE 25 MG/1
12.5 TABLET ORAL EVERY 6 HOURS PRN
Status: DISCONTINUED | OUTPATIENT
Start: 2020-11-21 | End: 2020-11-21

## 2020-11-21 RX ORDER — SODIUM CHLORIDE 9 MG/ML
5 INJECTION INTRAVENOUS DAILY
Status: DISCONTINUED | OUTPATIENT
Start: 2020-11-21 | End: 2020-11-21

## 2020-11-21 RX ORDER — ONDANSETRON 4 MG/1
4 TABLET, FILM COATED ORAL EVERY 8 HOURS PRN
Status: CANCELLED | OUTPATIENT
Start: 2020-11-21

## 2020-11-21 RX ORDER — ONDANSETRON 2 MG/ML
4 INJECTION INTRAMUSCULAR; INTRAVENOUS ONCE
Status: COMPLETED | OUTPATIENT
Start: 2020-11-21 | End: 2020-11-21

## 2020-11-21 RX ORDER — LEVOTHYROXINE SODIUM 112 UG/1
112 TABLET ORAL DAILY
Status: DISCONTINUED | OUTPATIENT
Start: 2020-11-22 | End: 2020-11-24 | Stop reason: HOSPADM

## 2020-11-21 RX ORDER — ONDANSETRON 2 MG/ML
6 INJECTION INTRAMUSCULAR; INTRAVENOUS EVERY 6 HOURS PRN
Status: DISCONTINUED | OUTPATIENT
Start: 2020-11-21 | End: 2020-11-24 | Stop reason: HOSPADM

## 2020-11-21 RX ORDER — ONDANSETRON 2 MG/ML
4 INJECTION INTRAMUSCULAR; INTRAVENOUS EVERY 6 HOURS PRN
Status: DISCONTINUED | OUTPATIENT
Start: 2020-11-21 | End: 2020-11-21

## 2020-11-21 RX ORDER — LEVOTHYROXINE SODIUM ANHYDROUS 100 UG/5ML
112 INJECTION, POWDER, LYOPHILIZED, FOR SOLUTION INTRAVENOUS DAILY
Status: DISCONTINUED | OUTPATIENT
Start: 2020-11-21 | End: 2020-11-21

## 2020-11-21 RX ORDER — PROMETHAZINE HYDROCHLORIDE 25 MG/1
25 TABLET ORAL ONCE
Status: COMPLETED | OUTPATIENT
Start: 2020-11-21 | End: 2020-11-21

## 2020-11-21 RX ORDER — SODIUM CHLORIDE 9 MG/ML
10 INJECTION INTRAVENOUS EVERY 12 HOURS
Status: DISCONTINUED | OUTPATIENT
Start: 2020-11-21 | End: 2020-11-21 | Stop reason: DRUGHIGH

## 2020-11-21 RX ORDER — PANTOPRAZOLE SODIUM 40 MG/10ML
40 INJECTION, POWDER, LYOPHILIZED, FOR SOLUTION INTRAVENOUS ONCE
Status: DISCONTINUED | OUTPATIENT
Start: 2020-11-21 | End: 2020-11-21

## 2020-11-21 RX ORDER — 0.9 % SODIUM CHLORIDE 0.9 %
1000 INTRAVENOUS SOLUTION INTRAVENOUS ONCE
Status: COMPLETED | OUTPATIENT
Start: 2020-11-21 | End: 2020-11-21

## 2020-11-21 RX ORDER — 0.9 % SODIUM CHLORIDE 0.9 %
250 INTRAVENOUS SOLUTION INTRAVENOUS ONCE
Status: DISCONTINUED | OUTPATIENT
Start: 2020-11-22 | End: 2020-11-24 | Stop reason: HOSPADM

## 2020-11-21 RX ORDER — FENTANYL CITRATE 50 UG/ML
INJECTION, SOLUTION INTRAMUSCULAR; INTRAVENOUS
Status: DISCONTINUED
Start: 2020-11-21 | End: 2020-11-22

## 2020-11-21 RX ORDER — LEVOTHYROXINE SODIUM 112 UG/1
112 TABLET ORAL DAILY
COMMUNITY

## 2020-11-21 RX ORDER — PANTOPRAZOLE SODIUM 40 MG/10ML
INJECTION, POWDER, LYOPHILIZED, FOR SOLUTION INTRAVENOUS
Status: COMPLETED
Start: 2020-11-21 | End: 2020-11-21

## 2020-11-21 RX ORDER — PANTOPRAZOLE SODIUM 40 MG/10ML
40 INJECTION, POWDER, LYOPHILIZED, FOR SOLUTION INTRAVENOUS ONCE
Status: COMPLETED | OUTPATIENT
Start: 2020-11-21 | End: 2020-11-21

## 2020-11-21 RX ORDER — MIDAZOLAM HYDROCHLORIDE 1 MG/ML
INJECTION INTRAMUSCULAR; INTRAVENOUS
Status: DISCONTINUED
Start: 2020-11-21 | End: 2020-11-22

## 2020-11-21 RX ORDER — SODIUM CHLORIDE 9 MG/ML
INJECTION, SOLUTION INTRAVENOUS CONTINUOUS
Status: DISCONTINUED | OUTPATIENT
Start: 2020-11-21 | End: 2020-11-23

## 2020-11-21 RX ORDER — LEVOTHYROXINE SODIUM 112 UG/1
112 TABLET ORAL DAILY
Status: CANCELLED | OUTPATIENT
Start: 2020-11-22

## 2020-11-21 RX ORDER — PANTOPRAZOLE SODIUM 40 MG/10ML
40 INJECTION, POWDER, LYOPHILIZED, FOR SOLUTION INTRAVENOUS EVERY 12 HOURS
Status: DISCONTINUED | OUTPATIENT
Start: 2020-11-22 | End: 2020-11-21 | Stop reason: DRUGHIGH

## 2020-11-21 RX ADMIN — PANTOPRAZOLE SODIUM 40 MG: 40 INJECTION, POWDER, FOR SOLUTION INTRAVENOUS at 20:06

## 2020-11-21 RX ADMIN — SODIUM CHLORIDE: 9 INJECTION, SOLUTION INTRAVENOUS at 21:53

## 2020-11-21 RX ADMIN — PANTOPRAZOLE SODIUM 40 MG: 40 INJECTION, POWDER, LYOPHILIZED, FOR SOLUTION INTRAVENOUS at 20:06

## 2020-11-21 RX ADMIN — SODIUM CHLORIDE 1000 ML: 9 INJECTION, SOLUTION INTRAVENOUS at 20:09

## 2020-11-21 RX ADMIN — Medication 10 ML: at 20:09

## 2020-11-21 RX ADMIN — ONDANSETRON 4 MG: 2 INJECTION INTRAMUSCULAR; INTRAVENOUS at 17:10

## 2020-11-21 RX ADMIN — SODIUM CHLORIDE 8 MG/HR: 9 INJECTION, SOLUTION INTRAVENOUS at 22:26

## 2020-11-21 RX ADMIN — PROMETHAZINE HYDROCHLORIDE 25 MG: 25 TABLET ORAL at 19:05

## 2020-11-21 RX ADMIN — PANTOPRAZOLE SODIUM 40 MG: 40 INJECTION, POWDER, FOR SOLUTION INTRAVENOUS at 22:25

## 2020-11-21 ASSESSMENT — PAIN DESCRIPTION - PAIN TYPE
TYPE: ACUTE PAIN

## 2020-11-21 ASSESSMENT — ENCOUNTER SYMPTOMS
VOMITING: 1
BLOOD IN STOOL: 1
CHOKING: 0
CHEST TIGHTNESS: 0
COUGH: 0
DIARRHEA: 0
SINUS PAIN: 0
ABDOMINAL DISTENTION: 0
SHORTNESS OF BREATH: 1
CONSTIPATION: 0
FACIAL SWELLING: 0
EYE DISCHARGE: 0
PHOTOPHOBIA: 0
SINUS PRESSURE: 0
SORE THROAT: 0
NAUSEA: 1
RHINORRHEA: 0
EYE PAIN: 0
BACK PAIN: 0
EYE REDNESS: 0
WHEEZING: 0
EYE ITCHING: 0
ABDOMINAL PAIN: 1

## 2020-11-21 ASSESSMENT — PAIN DESCRIPTION - LOCATION
LOCATION: CHEST

## 2020-11-21 ASSESSMENT — PAIN DESCRIPTION - ORIENTATION
ORIENTATION: MID

## 2020-11-21 ASSESSMENT — PAIN DESCRIPTION - PROGRESSION
CLINICAL_PROGRESSION: GRADUALLY IMPROVING
CLINICAL_PROGRESSION: NOT CHANGED

## 2020-11-21 ASSESSMENT — PAIN DESCRIPTION - ONSET
ONSET: ON-GOING

## 2020-11-21 ASSESSMENT — PAIN SCALES - GENERAL
PAINLEVEL_OUTOF10: 3
PAINLEVEL_OUTOF10: 4
PAINLEVEL_OUTOF10: 4
PAINLEVEL_OUTOF10: 6

## 2020-11-21 ASSESSMENT — PAIN DESCRIPTION - FREQUENCY
FREQUENCY: CONTINUOUS
FREQUENCY: CONTINUOUS
FREQUENCY: INTERMITTENT

## 2020-11-21 NOTE — ED NOTES
Pt resting in bed, VS reassessed and stable. IV site checked, and pain reassessed. Pt updated on POC and verbalizes understanding as well as no further needs, questions, or concerns at this time. Bed rails up X2 and call light within reach. Will continue to monitor.       Wilfredo Heaton RN  11/21/20 [id number]

## 2020-11-21 NOTE — PROGRESS NOTES
Pharmacy Medication History Note      List of current medications patient is taking is complete. Source of information: patient    Changes made to medication list:  Medications removed (include reason, ex. therapy complete or physician discontinued):        Medications added/doses adjusted:  Synthroid 112 mcg      Other notes (ex. Recent course of antibiotics, Coumadin dosing):    Denies use of other OTC or herbal medications.       Allergies reviewed      Electronically signed by Norman Dugan [address] [place] [address] on 11/21/2020 at 5:18 PM

## 2020-11-21 NOTE — ACP (ADVANCE CARE PLANNING)
Advance Care Planning     Advance Care Planning Activator (Inpatient)  Conversation Note      Date of ACP Conversation: 11/21/2020    Conversation Conducted with: Patient with Decision Making Capacity    ACP Activator: Faiza Avitia    If no Authorized Kelly Armendariz has previously been identified, then patient chooses Health Care Decision Maker:  \"Who would you like to name as your primary health care decision-maker? \"               Name: Stacy Cason        Relationship: spouse          Phone number: [phone]  \"Can this person be reached easily? \" Yes  \"Who would you like to name as your back-up decision maker? \"   Name: Hay Pratt        Relationship: child          Phone number: [phone]  \"Can this person be reached easily? \" Yes    Care Preferences    Ventilation: \"If you were in your present state of health and suddenly became very ill and were unable to breathe on your own, what would your preference be about the use of a ventilator (breathing machine) if it were available to you? \"      Would the patient desire the use of ventilator (breathing machine)?: yes    \"If your health worsens and it becomes clear that your chance of recovery is unlikely, what would your preference be about the use of a ventilator (breathing machine) if it were available to you? \"     Would the patient desire the use of ventilator (breathing machine)?: No    Resuscitation  \"CPR works best to restart the heart when there is a sudden event, like a heart attack, in someone who is otherwise healthy. Unfortunately, CPR does not typically restart the heart for people who have serious health conditions or who are very sick. \"    \"In the event your heart stopped as a result of an underlying serious health condition, would you want attempts to be made to restart your heart (answer \"yes\" for attempt to resuscitate) or would you prefer a natural death (answer \"no\" for do not attempt to resuscitate)? \" yes     [] Yes   [x] No   Educated Patient / Durkee Webster regarding differences between Advance Directives and portable DNR orders. Length of ACP Conversation in minutes:  10    Conversation Outcomes:  [x] ACP discussion completed  [] Existing advance directive reviewed with patient; no changes to patient's previously recorded wishes  [] New Advance Directive completed  [] Portable Do Not Rescitate prepared for Provider review and signature  [] POLST/POST/MOLST/MOST prepared for Provider review and signature      Follow-up plan:    [] Schedule follow-up conversation to continue planning  [x] Referred individual to Provider for additional questions/concerns   [] Advised patient/agent/surrogate to review completed ACP document and update if needed with changes in condition, patient preferences or care setting    [] This note routed to one or more involved healthcare providers     Pt came into ED due to chest pain hx a-fib, rectal bleed and labored breathing. Hbg 9.8. Type and Cross Match obtained while I was in room. Met with pt (prefers to be called Otf Bean) and spouse Shantell Jimenez in ED room 10 to discuss ACP. Pt would like to remain full code at this time. Pt never worked outside the home. She was a full-time mom of 4 children. She has 7 grandchildren and 2 great-grandchildren. She attends [employer].  Understands that this is a [place] she was born here and will probably die here someday.

## 2020-11-21 NOTE — ED PROVIDER NOTES
[address] COMPLAINT  Chief Complaint   Patient presents with    Chest Pain    Rectal Bleeding       Nurses Notes reviewed and I agree except as noted in the HPI. HPI     Olivia Vides is a 70 y.o. female who presents for evaluation of chest pain and rectal bleeding. She states that on Friday she was feeling quite ill to her stomach, and try to take an [place] to help with the pain however she promptly threw it up. She states that after that her stomach was quite nauseous, and she felt like she needed to throw up but nothing would come up. She states that she was passing large amounts of blood in her stool. She described it as \"I thought I had a miscarriage\". He denies any abdominal pain. She stated that while this was going on she developed chest pain. She describes the chest pain as in the center of her chest radiating between both the left and right sides. More of a squeezing sensation instead of a stabbing sensation. She states that it felt very similar to when she had a PE years ago. She was having shortness of breath with this, however she denied wheezing or cough with sputum. She stated that it was difficult for her to ambulate without having to stop to catch her breath. She had an episode while ambulating where she became very short of breath and became increasingly diaphoretic, and she thought she was going to pass out. She denies any loss of consciousness or any falls due to the symptoms. She states no swelling in her lower limbs or numbness or tingling in her lower limbs. She states no loss of blood from other areas. She states that she is not on any blood thinners such as aspirin, Plavix, or new oral anticoagulants. However she does states she takes Aleve on occasion for joint pain.   She states that she was told she had A. fib upon arrival to the ER, and she states that she has never had a history of A. fib or other arrhythmias in the past.  However she was told that a fast heart rate could mean A. fib and now she is concerned that she might of had episodes prior. She denies any other cardiac history including past history of MI or stent placement. She states that she does not have a history of hyperlipidemia, hypertension, or coronary artery disease. She states that she does not know where the blood might be coming from in her stool, she states that this is unusual for her. She denies eating any red foods or red food dye. She states she has had a colonoscopy in the past, last one occurring \"years ago\" which was normal at the time. REVIEW OF SYSTEMS  Review of Systems   Constitutional: Positive for diaphoresis (Episode of diaphoresis yesterday). Negative for activity change, chills, fatigue and fever. HENT: Negative for congestion, ear pain, facial swelling, rhinorrhea, sinus pressure, sinus pain, sneezing, sore throat and tinnitus. Eyes: Negative for photophobia, pain, discharge, redness and itching. Respiratory: Positive for shortness of breath (States hard to catch breath when ambulating, states she finds it hard to breathe if she is not sitting upright). Negative for cough, choking, chest tightness and wheezing. Cardiovascular: Positive for chest pain (States that squeezing chest pain located across both the left and right chest) and palpitations (States she has been able to feel her heart \"beating out of her chest\"). Negative for leg swelling. Gastrointestinal: Positive for abdominal pain (More in the epigastric area), blood in stool (States large amount of blood in stool), nausea and vomiting (Last episode occurring last night). Negative for abdominal distention, constipation and diarrhea. Genitourinary: Negative for difficulty urinating, dysuria, flank pain, frequency, hematuria and urgency.    Musculoskeletal: Negative for arthralgias, back pain, gait problem, joint swelling, myalgias, neck pain and neck stiffness. Skin: Negative for pallor, rash and wound. Neurological: Positive for dizziness and light-headedness. Negative for tremors, seizures, syncope (States an episode of presyncope including diaphoresis and lightheadedness.), weakness, numbness and headaches. PAST MEDICAL HISTORY   has a past medical history of Asthma, Glaucoma, and Thyroid disease. SURGICAL HISTORY   has a past surgical history that includes hernia repair ([id number]); Hysterectomy (); Cholecystectomy (); Incontinence surgery (12); and hernia repair (Left, 2020). CURRENT MEDICATIONS  Previous Medications    CHOLECALCIFEROL (VITAMIN D) 2000 UNITS TABS    Take 4,000 Units by mouth daily. LATANOPROST (XALATAN) 0.005 % OPHTHALMIC SOLUTION    Place 1 drop into both eyes nightly     LEVOTHYROXINE (SYNTHROID) 112 MCG TABLET    Take 112 mcg by mouth Daily    MAGNESIUM OXIDE 600 MG CAPS    Take 600 mg by mouth nightly     MELATONIN 3 MG TABS    Take 3 mg by mouth nightly. MULTIPLE VITAMINS PO    Take 1 tablet by mouth daily     ONDANSETRON (ZOFRAN) 4 MG TABLET    Take 1 tablet by mouth every 8 hours as needed for Nausea or Vomiting       ALLERGIES  is allergic to optiray [ioversol]; codeine; dye [iodides]; iodine; pcn [penicillins]; and sulfa antibiotics. FAMILY HISTORY  She indicated that her father is . family history includes Cancer in her father. SOCIAL HISTORY   reports that she has quit smoking. She has never used smokeless tobacco. She reports previous alcohol use. She reports that she does not use drugs. PHYSICAL EXAM     INITIAL VITALS: /88   Pulse 107   Temp 97.9 °F (36.6 °C) (Oral)   Resp 16   Ht 5' 6\" (1.676 m)   Wt 177 lb (80.3 kg)   SpO2 96%   BMI 28.57 kg/m² Estimated body mass index is 28.57 kg/m² as calculated from the following:    Height as of this encounter: 5' 6\" (1.676 m).     Weight as of this encounter: 177 lb (80.3 kg). Physical Exam  Vitals signs and nursing note reviewed. Constitutional:       General: She is not in acute distress. Appearance: Normal appearance. She is not ill-appearing, toxic-appearing or diaphoretic. HENT:      Head: Normocephalic and atraumatic. Right Ear: External ear normal.      Left Ear: External ear normal.      Nose: Nose normal. No congestion or rhinorrhea. Mouth/Throat:      Mouth: Mucous membranes are moist.      Pharynx: Oropharynx is clear. No oropharyngeal exudate or posterior oropharyngeal erythema. Comments: Appears a bit pale  Eyes:      General: No scleral icterus. Extraocular Movements: Extraocular movements intact. Conjunctiva/sclera: Conjunctivae normal.      Pupils: Pupils are equal, round, and reactive to light. Comments: Mucosal lining appears pale   Neck:      Musculoskeletal: Normal range of motion and neck supple. No muscular tenderness. Cardiovascular:      Rate and Rhythm: Tachycardia present. Rhythm irregular. Pulses: Normal pulses. Heart sounds: Normal heart sounds. No murmur. No friction rub. No gallop. Comments: Heart rate at about 100 in the room, there is noted irregularity  Pulmonary:      Effort: Pulmonary effort is normal.      Breath sounds: Normal breath sounds. No wheezing, rhonchi or rales. Comments: Patient has remained sitting upright in the room otherwise she describes shortness of breath  Chest:      Chest wall: No tenderness. Abdominal:      General: Abdomen is flat. Bowel sounds are normal.      Palpations: Abdomen is soft. There is no mass. Tenderness: There is no abdominal tenderness. There is no guarding. Hernia: No hernia is present. Genitourinary:     Rectum: Normal.      Comments: Fecal occult test sent down to the lab, no gross blood seen on the rectal exam.  Musculoskeletal:         General: No swelling, tenderness or signs of injury.       Right lower leg: Edema (Mild, considered normal for the patient) present. Left lower leg: Edema (Mild, considered normal for the patient) present. Lymphadenopathy:      Cervical: No cervical adenopathy. Skin:     General: Skin is warm and dry. Capillary Refill: Capillary refill takes less than 2 seconds. Coloration: Skin is not jaundiced. Findings: No erythema, lesion or rash. Neurological:      General: No focal deficit present. Mental Status: She is alert and oriented to person, place, and time. Mental status is at baseline. Cranial Nerves: No cranial nerve deficit. Motor: No weakness. Psychiatric:         Mood and Affect: Mood normal.         Behavior: Behavior normal.         MEDICAL DECISION MAKING  Review of past medical records show no significant cardiac history. The patient does not have a history of hypertension, coronary artery disease, or hyperlipidemia. She has never had stents placed before. She does not have a history of arrhythmias. Her last colonoscopy was considered normal, however this was years ago. Initial assessment: Stable appearing elderly female, however she does appear to be a little pale on exam.  She also cannot stay lying flat for extended periods of time without perceived extreme shortness of breath. Plan: Basic cardiac work-up for A. fib, as well as basic labs for potential anemia causing A. Fib. BMP (BUN 29, Cr 0.3)   Hepatic Function Panel (TP 5.5)   BNP (31.0)   Troponin (<0.010)   CBC (HgB 9.8 (HgB 11.0 in 9/2020), MCV 93.8)   Fecal Occult Test (Postive)   CXR (Bibasilar Opacities (simialr to previous exams), Hyper expanded lungs)   EKG (see below)   D-Dimer (1378.00)   COVID (Negative)   CT Abdomen w/ Contrast (Severe Diverticulosis, no diverticulitis, no abdominal abnormalities)     Zofran and Phenergan added for Nausea.     DIFFERENTIAL DIAGNOSIS:  Afib Secondary to Acute Anemia  Paroxsymal Afib  Anemia due to GI loss  PE  ACS  AAA  Diverticulitis DIAGNOSTIC RESULTS    EKG    Rhythm: Tachycardia with PACs noted  Rate: 122  Axis: Normal  OK Interval: 150  QRS: 70  QTc: 578  Ectopy: PACs  Conduction: SA [place]  ST Segments: No change  T Waves: No peaking  Q Waves: None Seen    Clinical Impression: Normal sinus rhythm with no acute changes/normal EKG      RADIOLOGY:  I have reviewed radiologic plain film image(s). The plain films will be read or over read by the radiologist.All other non-plain film images(s) such as CT, Ultrasound and MRI have been read by the radiologist.  CT ABDOMEN PELVIS WO CONTRAST Additional Contrast? None   Final Result   Severe diverticulosis without evidence of diverticulitis. No acute abdominal or pelvic abnormalities identified. **This report has been created using voice recognition software. It may contain minor errors which are inherent in voice recognition technology. **      Final report electronically signed by Dr. Hernando Rondon on 11/21/2020 6:17 PM      XR CHEST PORTABLE   Final Result      1. Bibasilar opacities which may be due to infiltrates or atelectasis. 2. Hyperexpanded lungs with flattening of hemidiaphragms, findings which may be due to emphysematous changes. **This report has been created using voice recognition software. It may contain minor errors which are inherent in voice recognition technology. **      Final report electronically signed by Dr Carlos Bruce on 11/21/2020 1:51 PM          LABS:  Labs Reviewed   BASIC METABOLIC PANEL W/ REFLEX TO MG FOR LOW K - Abnormal; Notable for the following components:       Result Value    Glucose 110 (*)     BUN 29 (*)     CREATININE 0.3 (*)     All other components within normal limits   CBC WITH AUTO DIFFERENTIAL - Abnormal; Notable for the following components:    RBC 3.24 (*)     Hemoglobin 9.8 (*)     Hematocrit 30.4 (*)     RDW-SD 45.8 (*)     All other components within normal limits   HEPATIC FUNCTION PANEL - Abnormal; Notable for the following components: Total Bilirubin 0.2 (*)     Total Protein 5.5 (*)     All other components within normal limits   D-DIMER, QUANTITATIVE - Abnormal; Notable for the following components:    D-Dimer, Quant 1378.00 (*)     All other components within normal limits   BRAIN NATRIURETIC PEPTIDE   TROPONIN   BLOOD OCCULT STOOL SCREEN #1   ANION GAP   GLOMERULAR FILTRATION RATE, ESTIMATED   OSMOLALITY   COVID-19   TYPE AND SCREEN     All other unresulted laboratory test above are normal:    Vitals:    Vitals:    11/21/20 1418 11/21/20 1510 11/21/20 1619 11/21/20 1815   BP: (!) 90/58 (!) 99/56  124/88   Pulse: 108 97 99 107   Resp: 20 18 18 16   Temp:       TempSrc:       SpO2: 99% 100% 99% 96%   Weight:       Height:           EMERGENCY DEPARTMENT COURSE:    Medications   promethazine (PHENERGAN) tablet 25 mg (has no administration in time range)   ondansetron (ZOFRAN) injection 4 mg (4 mg Intravenous Given 11/21/20 1710)            The patient was seen and evaluated by me. She was noted to be tachycardic on arrival, as well as mildly tachypneic. Temperature and blood pressure was were within normal range. Patient is oxygenating well on room air. Laboratory and radiological studies were performed, results were reviewed with the patient. Within the department, the patient was treated with Zofran. I observed the patient's condition to remain stable during the duration of their stay. I explained my proposed course of treatment to the patient, and they were amenable to my decision. Due to the two-point drop in hemoglobin over the past 2 months, the patient should be admitted for further evaluation of GI bleeding and potential GI hemorrhage. She is currently stable in the room however her blood pressure is still running on the low side. She was given Zofran and Phenergan for nausea. Talk to the admitting provider to have the patient admitted.     Controlled Substances Monitoring:       CRITICAL CARE:  None. CONSULTS:  None. PROCEDURES:  None. FINAL IMPRESSION:  1. Lower GI bleeding    2. Generalized abdominal pain    3. Diverticulosis of colon without diverticulitis        DISPOSITION/PLAN:  PATIENT REFERRED TO:  No follow-up provider specified. DISCHARGE MEDICATIONS:  New Prescriptions    No medications on file         (Please note that portions of this note were completed with a voice recognition program and electronically transcribed. Efforts were made to edit the dictations but occasionally words are mis-transcribed. This transcription may contain errors not detected in proofreading.  This transcription was electronically signed.)    Electronically signed by Hiram Freed DO on 11/21/2020 at 6:44 PM     Hiram Freed DO  Resident  11/21/20 [facility]  [facility]  11/21/20 [id number]

## 2020-11-21 NOTE — ED PROVIDER NOTES
[address]  EMERGENCY MEDICINE ATTENDING ATTESTATION      Evaluation of [address] [place] [address]. Case discussed and care plan developed with resident physician. I agree with the resident physician documentation and plan as documented by her, except if my documentation differs. Patient seen, interviewed and examined by me. I reviewed the medical, surgical, family and social history, medications and allergies. I have reviewed the nursing documentation. I have reviewed the patient's vital signs and are abnormal from Borderline hypotension, tachycardia per my interpretation. Pulsoxymetry is normal per my interpretation. Brief H&P   Patient c/o bright red blood per rectum since last night, multiple episodes and so she decided to come today. Also complaining of epigastric abdominal pain and chest pain since yesterday afternoon. Patient states 3 or 5 days ago she had nausea and vomited once, but did not have any more symptoms until yesterday afternoon. Physical exam is notable for well appearing, slightly pale, cooperative, able to give full history. RRR, normal S1/S2, No M/R/G. BS present and normal bilaterally, no wheezing, ronchi or rales. Abdomen is soft, tender on deep palpation on epigastric area and right upper quadrant, negative Godfrey sign, non-distended, BS positive in 4 quadrants, no HSM, no masses. Medical Decision Making   MDM: Bright red blood per rectum, lower GI hemorrhage, consider significant upper GI hemorrhage. Chest pain, shortness of breath, dizziness, consider ACS, AAA, new PE, COVID-19, among others. Plan: I V line, labs, imaging, observation. Patient will require to be admitted. Patient has history of anaphylactic reactions to CT scan IV contrast.    Please see the resident physician completed note for final disposition except as documented on this attestation.    I have reviewed and interpreted all available lab, radiology and ekg results available at the moment. Diagnosis, treatment and disposition plans were discussed and agreed upon by patient. This transcription was electronically signed. It was dictated by use of voice recognition software and electronically transcribed. The transcription may contain errors not detected in proofreading.      I performed direct supervision and was present for the critical portion following procedures: None  Critical care time on this case: None    Electronically signed by Eduar Dick MD on 11/21/20 at 2:55 PM RASHARD Dick MD  11/21/20 [id number]

## 2020-11-22 LAB
ANION GAP SERPL CALCULATED.3IONS-SCNC: 10 MEQ/L (ref 8–16)
BUN BLDV-MCNC: 25 MG/DL (ref 7–22)
CALCIUM SERPL-MCNC: 7.9 MG/DL (ref 8.5–10.5)
CHLORIDE BLD-SCNC: 108 MEQ/L (ref 98–111)
CO2: 23 MEQ/L (ref 23–33)
CREAT SERPL-MCNC: 0.4 MG/DL (ref 0.4–1.2)
EKG ATRIAL RATE: 103 BPM
EKG P AXIS: 74 DEGREES
EKG P-R INTERVAL: 172 MS
EKG Q-T INTERVAL: 296 MS
EKG QRS DURATION: 72 MS
EKG QTC CALCULATION (BAZETT): 387 MS
EKG R AXIS: 45 DEGREES
EKG T AXIS: 80 DEGREES
EKG VENTRICULAR RATE: 103 BPM
ERYTHROCYTE [DISTWIDTH] IN BLOOD BY AUTOMATED COUNT: 15 % (ref 11.5–14.5)
ERYTHROCYTE [DISTWIDTH] IN BLOOD BY AUTOMATED COUNT: 15.7 % (ref 11.5–14.5)
ERYTHROCYTE [DISTWIDTH] IN BLOOD BY AUTOMATED COUNT: 15.7 % (ref 11.5–14.5)
ERYTHROCYTE [DISTWIDTH] IN BLOOD BY AUTOMATED COUNT: 50.1 FL (ref 35–45)
ERYTHROCYTE [DISTWIDTH] IN BLOOD BY AUTOMATED COUNT: 52 FL (ref 35–45)
ERYTHROCYTE [DISTWIDTH] IN BLOOD BY AUTOMATED COUNT: 52.2 FL (ref 35–45)
GFR SERPL CREATININE-BSD FRML MDRD: > 90 ML/MIN/1.73M2
GLUCOSE BLD-MCNC: 128 MG/DL (ref 70–108)
HCT VFR BLD CALC: 23.3 % (ref 37–47)
HCT VFR BLD CALC: 23.7 % (ref 37–47)
HCT VFR BLD CALC: 26.7 % (ref 37–47)
HEMOGLOBIN: 7.5 GM/DL (ref 12–16)
HEMOGLOBIN: 7.7 GM/DL (ref 12–16)
HEMOGLOBIN: 8.8 GM/DL (ref 12–16)
IRON: 216 UG/DL (ref 50–170)
MCH RBC QN AUTO: 29 PG (ref 26–33)
MCH RBC QN AUTO: 30 PG (ref 26–33)
MCH RBC QN AUTO: 30.2 PG (ref 26–33)
MCHC RBC AUTO-ENTMCNC: 31.6 GM/DL (ref 32.2–35.5)
MCHC RBC AUTO-ENTMCNC: 33 GM/DL (ref 32.2–35.5)
MCHC RBC AUTO-ENTMCNC: 33 GM/DL (ref 32.2–35.5)
MCV RBC AUTO: 91.1 FL (ref 81–99)
MCV RBC AUTO: 91.4 FL (ref 81–99)
MCV RBC AUTO: 91.5 FL (ref 81–99)
PLATELET # BLD: 135 THOU/MM3 (ref 130–400)
PLATELET # BLD: 136 THOU/MM3 (ref 130–400)
PLATELET # BLD: 147 THOU/MM3 (ref 130–400)
PMV BLD AUTO: 11.3 FL (ref 9.4–12.4)
PMV BLD AUTO: 11.6 FL (ref 9.4–12.4)
PMV BLD AUTO: 11.7 FL (ref 9.4–12.4)
POTASSIUM REFLEX MAGNESIUM: 4 MEQ/L (ref 3.5–5.2)
RBC # BLD: 2.55 MILL/MM3 (ref 4.2–5.4)
RBC # BLD: 2.59 MILL/MM3 (ref 4.2–5.4)
RBC # BLD: 2.93 MILL/MM3 (ref 4.2–5.4)
SODIUM BLD-SCNC: 141 MEQ/L (ref 135–145)
TOTAL IRON BINDING CAPACITY: < 233 UG/DL (ref 171–450)
TROPONIN T: < 0.01 NG/ML
UREASE-CLO-C. PYLORI: NEGATIVE
WBC # BLD: 6.2 THOU/MM3 (ref 4.8–10.8)
WBC # BLD: 6.7 THOU/MM3 (ref 4.8–10.8)
WBC # BLD: 9 THOU/MM3 (ref 4.8–10.8)

## 2020-11-22 PROCEDURE — 6370000000 HC RX 637 (ALT 250 FOR IP): Performed by: INTERNAL MEDICINE

## 2020-11-22 PROCEDURE — 0DB78ZX EXCISION OF STOMACH, PYLORUS, VIA NATURAL OR ARTIFICIAL OPENING ENDOSCOPIC, DIAGNOSTIC: ICD-10-PCS | Performed by: INTERNAL MEDICINE

## 2020-11-22 PROCEDURE — 80048 BASIC METABOLIC PNL TOTAL CA: CPT

## 2020-11-22 PROCEDURE — 2580000003 HC RX 258: Performed by: INTERNAL MEDICINE

## 2020-11-22 PROCEDURE — 83540 ASSAY OF IRON: CPT

## 2020-11-22 PROCEDURE — 6360000002 HC RX W HCPCS: Performed by: INTERNAL MEDICINE

## 2020-11-22 PROCEDURE — 36415 COLL VENOUS BLD VENIPUNCTURE: CPT

## 2020-11-22 PROCEDURE — 2060000000 HC ICU INTERMEDIATE R&B

## 2020-11-22 PROCEDURE — 94761 N-INVAS EAR/PLS OXIMETRY MLT: CPT

## 2020-11-22 PROCEDURE — 99232 SBSQ HOSP IP/OBS MODERATE 35: CPT | Performed by: INTERNAL MEDICINE

## 2020-11-22 PROCEDURE — 84484 ASSAY OF TROPONIN QUANT: CPT

## 2020-11-22 PROCEDURE — 83550 IRON BINDING TEST: CPT

## 2020-11-22 PROCEDURE — C9113 INJ PANTOPRAZOLE SODIUM, VIA: HCPCS | Performed by: INTERNAL MEDICINE

## 2020-11-22 PROCEDURE — 99152 MOD SED SAME PHYS/QHP 5/>YRS: CPT | Performed by: INTERNAL MEDICINE

## 2020-11-22 PROCEDURE — 93005 ELECTROCARDIOGRAM TRACING: CPT | Performed by: INTERNAL MEDICINE

## 2020-11-22 PROCEDURE — 6370000000 HC RX 637 (ALT 250 FOR IP): Performed by: PHYSICIAN ASSISTANT

## 2020-11-22 PROCEDURE — 3E0G8GC INTRODUCTION OF OTHER THERAPEUTIC SUBSTANCE INTO UPPER GI, VIA NATURAL OR ARTIFICIAL OPENING ENDOSCOPIC: ICD-10-PCS | Performed by: INTERNAL MEDICINE

## 2020-11-22 PROCEDURE — 85027 COMPLETE CBC AUTOMATED: CPT

## 2020-11-22 PROCEDURE — 3609013000 HC EGD TRANSORAL CONTROL BLEEDING ANY METHOD: Performed by: INTERNAL MEDICINE

## 2020-11-22 PROCEDURE — 87081 CULTURE SCREEN ONLY: CPT

## 2020-11-22 RX ORDER — LATANOPROST 50 UG/ML
1 SOLUTION/ DROPS OPHTHALMIC NIGHTLY
Status: DISCONTINUED | OUTPATIENT
Start: 2020-11-22 | End: 2020-11-24 | Stop reason: HOSPADM

## 2020-11-22 RX ORDER — FENTANYL CITRATE 50 UG/ML
INJECTION, SOLUTION INTRAMUSCULAR; INTRAVENOUS PRN
Status: DISCONTINUED | OUTPATIENT
Start: 2020-11-22 | End: 2020-11-22 | Stop reason: ALTCHOICE

## 2020-11-22 RX ORDER — MIDAZOLAM HYDROCHLORIDE 1 MG/ML
INJECTION INTRAMUSCULAR; INTRAVENOUS PRN
Status: DISCONTINUED | OUTPATIENT
Start: 2020-11-22 | End: 2020-11-22 | Stop reason: ALTCHOICE

## 2020-11-22 RX ORDER — SODIUM CHLORIDE 0.9 % (FLUSH) 0.9 %
10 SYRINGE (ML) INJECTION EVERY 12 HOURS SCHEDULED
Status: DISCONTINUED | OUTPATIENT
Start: 2020-11-22 | End: 2020-11-24 | Stop reason: HOSPADM

## 2020-11-22 RX ORDER — ACETAMINOPHEN 325 MG/1
650 TABLET ORAL EVERY 6 HOURS PRN
Status: DISCONTINUED | OUTPATIENT
Start: 2020-11-22 | End: 2020-11-24 | Stop reason: HOSPADM

## 2020-11-22 RX ORDER — LANOLIN ALCOHOL/MO/W.PET/CERES
3 CREAM (GRAM) TOPICAL NIGHTLY
Status: DISCONTINUED | OUTPATIENT
Start: 2020-11-22 | End: 2020-11-24 | Stop reason: HOSPADM

## 2020-11-22 RX ORDER — ACETAMINOPHEN 650 MG/1
650 SUPPOSITORY RECTAL EVERY 6 HOURS PRN
Status: DISCONTINUED | OUTPATIENT
Start: 2020-11-22 | End: 2020-11-24 | Stop reason: HOSPADM

## 2020-11-22 RX ORDER — SODIUM CHLORIDE 0.9 % (FLUSH) 0.9 %
10 SYRINGE (ML) INJECTION PRN
Status: DISCONTINUED | OUTPATIENT
Start: 2020-11-22 | End: 2020-11-24 | Stop reason: HOSPADM

## 2020-11-22 RX ADMIN — SODIUM CHLORIDE 100 MG: 9 INJECTION, SOLUTION INTRAVENOUS at 17:15

## 2020-11-22 RX ADMIN — LIDOCAINE HYDROCHLORIDE: 20 SOLUTION ORAL; TOPICAL at 17:57

## 2020-11-22 RX ADMIN — SODIUM CHLORIDE 8 MG/HR: 9 INJECTION, SOLUTION INTRAVENOUS at 08:30

## 2020-11-22 RX ADMIN — Medication 10 ML: at 21:27

## 2020-11-22 RX ADMIN — ONDANSETRON 6 MG: 2 INJECTION INTRAMUSCULAR; INTRAVENOUS at 01:39

## 2020-11-22 RX ADMIN — SODIUM CHLORIDE 8 MG/HR: 9 INJECTION, SOLUTION INTRAVENOUS at 21:00

## 2020-11-22 RX ADMIN — LEVOTHYROXINE SODIUM 112 MCG: 112 TABLET ORAL at 08:42

## 2020-11-22 RX ADMIN — SODIUM CHLORIDE: 9 INJECTION, SOLUTION INTRAVENOUS at 05:58

## 2020-11-22 ASSESSMENT — PAIN SCALES - GENERAL
PAINLEVEL_OUTOF10: 0
PAINLEVEL_OUTOF10: 0
PAINLEVEL_OUTOF10: 2
PAINLEVEL_OUTOF10: 0

## 2020-11-22 NOTE — BRIEF OP NOTE
Brief Postoperative Note      Patient: Homero Landa  YOB: 1945  MRN: [id number]    Date of Procedure: 11/21/2020    Pre-Op Diagnosis: GI BLEED    Post-Op Diagnosis: gastric ulcer with clot s/p 5 ml of epinephrine       Procedure(s):  EGD CONTROL HEMORRHAGE    Surgeon(s):  Vishnu Mcgill MD    Assistant:  * No surgical staff found *    Anesthesia: IV Sedation    Estimated Blood Loss (mL): Minimal    Complications: None    Specimens:   * No specimens in log *    Implants:  * No implants in log *      Drains: * No LDAs found *    Findings: gastric ulcer with clot s/p epinephrine .     Electronically signed by Vishnu Mcgill MD on 11/22/2020 at 1:04 AM

## 2020-11-22 NOTE — H&P
History & Physical        Patient:  Bonita Angel  YOB: 1945    MRN: [id number]     Acct: [de-identified]    PCP: Rizwana Pierre MD    Date of Admission: 11/21/2020    Date of Service: Pt seen/examined on 11/21/20  and Admitted to Inpatient with expected LOS greater than two midnights due to medical therapy. Chief Complaint:      Melena for two days      History Of Present Illness:    70 y.o. female who presented to [facility] with the above complain. She has hx of muscle cramp alleviated with alleeve, hx of abd hernia s/p surgery and hypothyroidism as well as remote PE currently not on any anticoagulation. She reports experiencing severe nausea two days ago which was followed by dark stools since last night. Stools tarry and sometimes liquid to sticky. Associated with severe nausea and and cramp/pain. Pain is dull and diffuse but worse at epigastric. Rated as 5/10 and non radiating. Last night, she experienced dizziness and diaphoresis on taking alkaselzer. This morning, she was severely unstable on her feet, has SOB with mild activities and has a projectile clear emesis. She reports tenesmus with each BM. She denies dysphagia, odynophagia, hemoptysis, skin color change, diarrhea, chest pain, change in medication or diet, recent infection, fever, chills or food poisoning. Patient does report taking alleeve on average x2 weekly for leg cramps for about two years now. She denies GERD, weight loss or hx of colonoscopy. Last coumadin use was on 2007. Addendum: patient still having melena and HB has dropped from 9.8 to 7.7. symptomatic. I had discussion with Dr. Michelle Gary (GI) who agree to come in for emergent EGD. Proceed to transfuse and IVF as endoscopy staff comes in. CT head reviewed and negative for intracranial abnormality.     Past Medical History:          Diagnosis Date    Asthma     Glaucoma     Thyroid disease        Past Surgical History:          Procedure Laterality Date    CHOLECYSTECTOMY  2005    HERNIA REPAIR  [phone]    abdominal    HERNIA REPAIR Left 9/27/2020    HERNIA INGUINAL REPAIR WITH MESH performed by Tomasa Shipman MD at [facility]  5-13-12    transobturator pelvilace sling-Dr Susan Hammond       Medications Prior to Admission:      Prior to Admission medications    Medication Sig Start Date End Date Taking? Authorizing Provider   levothyroxine (SYNTHROID) 112 MCG tablet Take 112 mcg by mouth Daily   Yes Historical Provider, MD   ondansetron (ZOFRAN) 4 MG tablet Take 1 tablet by mouth every 8 hours as needed for Nausea or Vomiting 9/30/20  Yes Tomasa Shipman MD   Magnesium Oxide 600 MG CAPS Take 600 mg by mouth nightly    Yes Historical Provider, MD   Melatonin 3 MG TABS Take 3 mg by mouth nightly. Yes Historical Provider, MD   MULTIPLE VITAMINS PO Take 1 tablet by mouth daily    Yes Historical Provider, MD   Cholecalciferol (VITAMIN D) 2000 UNITS TABS Take 4,000 Units by mouth daily. Yes Historical Provider, MD   latanoprost (XALATAN) 0.005 % ophthalmic solution Place 1 drop into both eyes nightly    Yes Historical Provider, MD       Allergies:  Optiray [ioversol]; Codeine; Dye [iodides]; Iodine; Pcn [penicillins]; and Sulfa antibiotics    Social History:      The patient currently lives     TOBACCO:   reports that she has quit smoking. She has never used smokeless tobacco.  ETOH:   reports previous alcohol use. Family History:       Reviewed in detail and negative for DM, CAD, Cancer, CVA. Positive as follows:        Problem Relation Age of Onset   Saida Mello Cancer Father         lung       Diet:  No diet orders on file    REVIEW OF SYSTEMS:   Pertinent positives as noted in the HPI. All other systems reviewed and negative.     PHYSICAL EXAM:    /88   Pulse 107   Temp 97.9 °F (36.6 °C) (Oral)   Resp 16   Ht 5' 6\" (1.676 m)   Wt 177 lb (80.3 kg)   SpO2 96%   BMI 28.57 kg/m²     General appearance:  No apparent distress, appears stated age and cooperative. HEENT:  Normal cephalic, atraumatic without obvious deformity. Pupils equal, round, and reactive to light. Extra ocular muscles intact. Conjunctivae/corneas clear. Neck: Supple, with full range of motion. No jugular venous distention. Trachea midline. Respiratory:  Normal respiratory effort. Clear to auscultation, bilaterally without Rales/Wheezes/Rhonchi. Cardiovascular:  Regular rate and rhythm with normal S1/S2 without murmurs, rubs or gallops. Abdomen: Soft, non-distended with normal bowel sounds. Tender in the epigastrium on deep palpation  Musculoskeletal:  No clubbing, cyanosis or edema bilaterally. Full range of motion without deformity. Skin:, texture, turgor normal.  No rashes or lesions. Pale on exam  Neurologic:  Neurovascularly intact without any focal sensory/motor deficits. Cranial nerves: II-XII intact, grossly non-focal.  Psychiatric:  Alert and oriented, thought content appropriate, normal insight  Capillary Refill: Brisk,< 3 seconds   Peripheral Pulses: +2 palpable, equal bilaterally       Labs:      Recent Labs     11/21/20  1401   WBC 7.2   HGB 9.8*   HCT 30.4*        Recent Labs     11/21/20  1401      K 4.4      CO2 25   BUN 29*   CREATININE 0.3*   CALCIUM 8.8     Recent Labs     11/21/20  1450   AST 19   ALT 16   BILIDIR <0.2   BILITOT 0.2*   ALKPHOS 42     No results for input(s): INR in the last 72 hours. No results for input(s): Alessandro Clemons in the last 72 hours. Urinalysis:      Lab Results   Component Value Date    NITRU Negative 10/15/2020    WBCUA 10-15 09/27/2020    BACTERIA MANY 09/27/2020    RBCUA 0-2 09/27/2020    BLOODU NEGATIVE 09/27/2020    GLUCOSEU NEGATIVE 09/27/2020       Intake & Output:  No intake/output data recorded. No intake/output data recorded.       Radiology:     CXR: I have reviewed the CXR with the following interpretation:   EKG:  I have reviewed the EKG with the following interpretation:     CT ABDOMEN PELVIS WO CONTRAST Additional Contrast? None   Final Result   Severe diverticulosis without evidence of diverticulitis. No acute abdominal or pelvic abnormalities identified. **This report has been created using voice recognition software. It may contain minor errors which are inherent in voice recognition technology. **      Final report electronically signed by Dr. Angela Brunner on 11/21/2020 6:17 PM      XR CHEST PORTABLE   Final Result      1. Bibasilar opacities which may be due to infiltrates or atelectasis. 2. Hyperexpanded lungs with flattening of hemidiaphragms, findings which may be due to emphysematous changes. **This report has been created using voice recognition software. It may contain minor errors which are inherent in voice recognition technology. **      Final report electronically signed by Dr Bryan Lynn on 11/21/2020 1:51 PM           DVT prophylaxis: {dvt prophylaxis:01553    Code Status: Prior      PT/OT Eval Status:     [facility] Problems    Diagnosis Date Noted    GI bleed [K92.2] 11/21/2020       PLAN:    1. GI bleed- most like upper GI or proximal Ileum going by melanotic nature. Possibly from PUD versus angiodysplasia versus gastritis. HB has dropped from 11.5 to current 9.8 in two months. Reports dizziness and SOB consistent with symptomatic anemia. Will start IV push PPI, type and cross, IVF continuous and control nausea. Will consult GI for possible EGD tomorrow. Monitor H/H q6 and transfuse if needed. Hold off all NSAIDs/anticoagultion including prophylaxis   2. Anemia of acute blood loss- from GI loss above. Continue IVF and consider transfusion if necessary  3. Seizure episode- patient had a seizure while about to be transported out of ER. No hx of per interview. Unclear source so far but patient received phenergan in the ER that may have provoked the episode.  Will discontinue phenergan and monitor closely. Will check head CT and consider EEG during stay. No evidence of ongoing infection. 4. Hypothyroidism- continue synthroid. Change to IV  5. Glaucoma- continue eye drops  6. Hx of PE- off coumadin. No evidence of recurrence. continue to monitor    Thank you Cruz Elizondo MD for the opportunity to be involved in this patient's care.     Electronically signed by Steph Cherry MD on 11/21/2020 at 7:48 PM

## 2020-11-22 NOTE — OP NOTE
[address] [place] [address]                                OPERATIVE REPORT    PATIENT NAME: Rochelle Brown                 :        1945  MED REC NO:   [id number]                           ROOM:  ACCOUNT NO:   [de-identified]                           ADMIT DATE: 2020  PROVIDER:     ROBERT Wheeler Slates OF PROCEDURE:  2020    PROCEDURE:  Esophagogastroduodenoscopy. SURGEON:  Matthew River MD    INDICATIONS:  A 61-year-old pleasant female who presented to the  hospital with GI bleeding. Hemoglobin dropped from 9.7 to 7.66 during  the emergency room stay. Multiple black melenic stools. The patient  also had a seizure activity in the emergency room. CT of the head was  negative, and she is receiving her first unit of packed red blood cell. She is undergoing further evaluation. Please see my consultation note  for details and for physical examination. ASA CLASSIFICATION:  Class III. MEDICATIONS:  Fentanyl 25 mcg IV, Versed 1 mg IV. Conscious sedation  was given in incremental fashion to induce and sustain conscious  sedation. INSTRUMENT:  GIF-190 gastroscope and sclerotherapy needle. PHOTOGRAPHS:  Yes. BIOPSIES:  Yes. ESTIMATED BLOOD LOSS:  Less than 10 mL. OPERATIVE PROCEDURE:  Procedure indications and complications including,  but not limited to, perforation, bleeding, infection, adverse reaction  to medicine, very slight chance of missing significant lesions, may not  be able to stop bleeding, may require surgery discussed with the patient  and the patient expressed her understanding and a written consent was  obtained. The patient was placed in the left lateral decubitus position in the  Endo Room #11. The patient was placed on appropriate monitoring of  vitals including blood pressure, heart rate, and pulse ox.   Oropharynx  was sprayed with Cetacaine spray and bite block was

## 2020-11-22 NOTE — ED NOTES
Pt taken to and from CT scan at this time. Pt currently drowsy,responds to verbal stimuli but otherwise appears to be resting comfortably. Bolus was started at 500 ml/hr at 2009, increased to 999 at this time. Pt currently appears to be tolerating well.       Prem Elise RN  11/21/20 [id number]

## 2020-11-22 NOTE — ED NOTES
Hospitalist contacted via perfect serve for updated on H&H. Orders placed per Dr. Joel Lee. KARLY Samayoa in to update pt on POC.       Severiano Rubio RN  11/21/20 8102

## 2020-11-22 NOTE — PROGRESS NOTES
EGD complete. Photos taken. Biopsies taken by cold forceps sent to lab in 1 specimen jar. Epinephrine 0.1mg/ml with a total of 4 ml injected into stomach to control bleeding. Endo scope  used. Patient tolerated well.

## 2020-11-22 NOTE — ED NOTES
Pt up in wheelchair for transport to floor. In to room to reassess and administer medications. Pt initially speaking and moaning in discomfort. This RN asked pt if she was in pain or if her nausea had worsened. Pt then went into snoring respirations and was unresponsive to verbal command. Pt began to have seizure-like activity with twitches of head and rapid eye movements. Pt's skin went pale and became cool and clammy. Sternal rub done, pt's eyes became fixed on this RN, still not responding and nystagmis noted. Help arrived to move pt back back to bed, pt incontinent of urine,  VS done, pt placed on telemetry monitor. Pt is very drowsy, difficult to arrouse but does respond to verbal stimuli. Dr. Jennifer Noguera to room to reassess pt. Hospitalist contacted per Dr. Jennifer Noguera.       Rochelle Magana, RN  11/21/20 4639 [facility], RN  11/21/20 3276

## 2020-11-22 NOTE — PROGRESS NOTES
Hospitalist      Patient:  Olivia Vides    Unit/Bed:4A-12/012-A  YOB: 1945  MRN: [id number]   Acct: [de-identified]     PCP: Chhaya Ferguson MD  Date of Admission: 11/21/2020        Assessment and Plan:        1. Upper GI bleed: Gastric ulcer, currently on IV PPI: Hemoglobin stable after transfusion. Biopsy results are pending may need to start triple therapy. 2. Anemia of acute blood loss: We will maintain hemoglobin greater than 8 due to comorbidities. 3. Questionable seizure episode may have been a vasovagal episode. 4. Hypothyroidism continue iv levothyroxine. CC:  melena    HPI: 37-year-old  female presents to [facility] for melena. Patient states that she has had black and bloody bowel movements. Over the last 2 days is a remote history of a PE but not on any current anticoagulation. Patient has abdominal pain is dull and diffuse in the epigastric area rates the pain as 5 out of 10 nonradiating. Last night she experienced some dizziness and diaphoresis when taking [place]. Patient states that she was severely unstable on her feet shortness of breath with any activity and has had. Clear emesis. She reports pain with each bowel movement. She denies any dysphagia, diet aphasia, hemoptysis, skin color changes, diarrhea, chest pain, changes in medication or diet. She denies fever chills or cough. Patient reports taking Aleve on average 2 times per week for leg cramps for about 2 years now. States the last time she was on Coumadin was in 2007. She has a past medical history of muscle cramps, abdominal hernia, status post surgery for the hernia hypothyroidism. ROS (14 point review of systems completed. Pertinent positives noted.  Otherwise ROS is negative) : Epigastric pain, melena, abdominal cramping    PMH:  Per HPI and       Diagnosis Date    Asthma     Glaucoma     Thyroid disease      SHX:        Procedure Laterality Date    CHOLECYSTECTOMY  2005    HERNIA REPAIR  [id number]    abdominal    HERNIA REPAIR Left 9/27/2020    HERNIA INGUINAL REPAIR WITH MESH performed by Abel Henson MD at [facility]  5-13-12    transobturator pelvilace sling-Dr Diana Castellon     FHX:       Problem Relation Age of Onset   [facility] Cancer Father         lung     SOCHX:   Social History     Socioeconomic History    Marital status:      Spouse name: Not on file    Number of children: Not on file    Years of education: Not on file    Highest education level: Not on file   Occupational History    Not on file   Social Needs    Financial resource strain: Not on file    Food insecurity     Worry: Not on file     Inability: Not on file    Transportation needs     Medical: Not on file     Non-medical: Not on file   Tobacco Use    Smoking status: Former Smoker    Smokeless tobacco: Never Used   Substance and Sexual Activity    Alcohol use: Not Currently    Drug use: Never    Sexual activity: Yes     Partners: Male   Lifestyle    Physical activity     Days per week: Not on file     Minutes per session: Not on file    Stress: Not on file   Relationships    Social connections     Talks on phone: Not on file     Gets together: Not on file     Attends [employer] service: Not on file     Active member of club or organization: Not on file     Attends meetings of clubs or organizations: Not on file     Relationship status: Not on file    Intimate partner violence     Fear of current or ex partner: Not on file     Emotionally abused: Not on file     Physically abused: Not on file     Forced sexual activity: Not on file   Other Topics Concern    Not on file   Social History Narrative    Not on file      Allergies: Optiray [ioversol]; Codeine; Dye [iodides]; Iodine; Pcn [penicillins];  Phenergan [promethazine]; and Sulfa antibiotics  Medications:     sodium chloride 150 mL/hr at 11/22/20 0558    pantoprozole (PROTONIX) infusion 8 mg/hr (11/22/20 0830)      latanoprost  1 drop Both Eyes Nightly    melatonin  3 mg Oral Nightly    sodium chloride flush  10 mL Intravenous 2 times per day    magnesium oxide  600 mg Oral Nightly    GI cocktail   Oral Once    levothyroxine  112 mcg Oral Daily    0.9 % sodium chloride  250 mL Intravenous Once     Prior to Admission medications    Medication Sig Start Date End Date Taking? Authorizing Provider   levothyroxine (SYNTHROID) 112 MCG tablet Take 112 mcg by mouth Daily   Yes Historical Provider, MD   ondansetron (ZOFRAN) 4 MG tablet Take 1 tablet by mouth every 8 hours as needed for Nausea or Vomiting 9/30/20  Yes Dhiraj Alexander MD   Magnesium Oxide 600 MG CAPS Take 600 mg by mouth nightly    Yes Historical Provider, MD   Melatonin 3 MG TABS Take 3 mg by mouth nightly. Yes Historical Provider, MD   MULTIPLE VITAMINS PO Take 1 tablet by mouth daily    Yes Historical Provider, MD   Cholecalciferol (VITAMIN D) 2000 UNITS TABS Take 4,000 Units by mouth daily. Yes Historical Provider, MD   latanoprost (XALATAN) 0.005 % ophthalmic solution Place 1 drop into both eyes nightly    Yes Historical Provider, MD      PHYSICAL EXAM:    /67   Pulse 95   Temp 97.7 °F (36.5 °C) (Oral)   Resp 20   Ht 5' 6\" (1.676 m)   Wt 180 lb 4.8 oz (81.8 kg)   SpO2 95%   BMI 29.10 kg/m²     General appearance:  No apparent distress, appears stated age and cooperative. HEENT:  Normal cephalic, atraumatic without obvious deformity. Pupils equal, round, and reactive to light. Extra ocular muscles intact. Conjunctivae/corneas clear. Neck: Supple, with full range of motion. no jugular venous distention. Trachea midline. no carotid bruits  Respiratory:  Normal respiratory effort. Clear to auscultation, bilaterally without Rales/Wheezes/Rhonchi. Breath sounds equal bilaterally  Cardiovascular:  Regular rate and rhythm with normal S1/S2 without murmurs, rubs or gallops.  PMI non displaced  Abdomen: Soft, mid epigastric tenderness with minimal palpation. Musculoskeletal:  No clubbing, cyanosis or edema bilaterally. Full range of motion without deformity. Skin: Skin color, texture, turgor normal.  No rashes or lesions, or suspicious lesions. Neurologic:  Neurovascularly intact without any focal sensory/motor deficits. Cranial nerves: II-XII intact, grossly non-focal.  Psychiatric:  Alert and oriented, thought content appropriate, normal insight  Capillary Refill: Brisk,< 2 seconds   Peripheral Pulses: +2 palpable, equal bilaterally upper and lower extremities  Lymphatics: no lymphadenopathy    Data: (All radiographs, tracings, PFTs, and imaging are personally viewed and interpreted unless otherwise noted).  H&H 8.8   Platelets 537   Creatinine 0.4  Recent Labs     11/21/20  1401 11/21/20  2145 11/22/20  0350   WBC 7.2  --  9.0   HGB 9.8* 7.7* 8.8*   HCT 30.4* 24.6* 26.7*     --  147      Recent Labs     11/21/20  1401 11/22/20  0350    141   K 4.4 4.0    108   CO2 25 23   BUN 29* 25*   CREATININE 0.3* 0.4   CALCIUM 8.8 7.9*      Recent Labs     11/21/20  1450   AST 19   ALT 16   BILIDIR <0.2   BILITOT 0.2*   ALKPHOS 42       No results for input(s): INR in the last 72 hours.  No results for input(s): Zettie Binet in the last 72 hours. Radiology reports-  Ct Abdomen Pelvis Wo Contrast Additional Contrast? None    Result Date: 11/21/2020  PROCEDURE: CT ABDOMEN PELVIS WO CONTRAST CLINICAL INFORMATION: Blood in Stool, Abdominal Pain . COMPARISON: No prior study. TECHNIQUE: 3 multiplanar noncontrast images of the abdomen and pelvis All CT scans at this facility use dose modulation, iterative reconstruction, and/or weight-based dosing when appropriate to reduce radiation dose to as low as reasonably achievable. FINDINGS: Limitations: Solid organ or hollow viscera evaluation limited without contrast. Lung bases No infiltrates or effusions.  Undersurface of the heart unremarkable. Small hiatal hernia. Abdomen pelvis Liver, and spleen are unremarkable. Prior cholecystectomy. Right adrenal somewhat small in size. Left adrenals normal. Noncontrasted kidneys appear unremarkable. Large duodenal diverticulum is noted. Aorta shows minimal calcific plaquing. 2 adjacent ventral abdominal hernias containing mesenteric fat this is cephalad to previously repaired hernia. No adenopathy. Pelvis Normal appendix. No evidence of bowel obstruction. Extensive diverticulosis without evidence of diverticulitis. Urinary bladder is mildly distended. Uterus is surgically absent. Bones No suspicious bone lesions     Severe diverticulosis without evidence of diverticulitis. No acute abdominal or pelvic abnormalities identified. **This report has been created using voice recognition software. It may contain minor errors which are inherent in voice recognition technology. ** Final report electronically signed by Dr. Anahy Pedersen on 11/21/2020 6:17 PM    Ct Head Wo Contrast    Result Date: 11/21/2020  CT head without contrast Comparison: None Findings: Mild chronic ischemic white matter disease with mild volume loss. No intracranial mass, midline shift, hydrocephalus, or acute hemorrhage. Sinuses and mastoid air cells are clear. No acute bony abnormality. Impression: No significant abnormalities. This document has been electronically signed by: Beata George MD on 11/21/2020 09:42 PM All CT scans at this facility use dose modulation, iterative reconstruction, and/or weight-based dosing when appropriate to reduce radiation dose to as low as reasonably achievable. Xr Chest Portable    Result Date: 11/21/2020  PROCEDURE: XR CHEST PORTABLE CLINICAL INFORMATION: 32-year-old female with chest pain. COMPARISON: Chest x-ray 9/27/2020. TECHNIQUE: AP upright view of the chest was obtained. FINDINGS: Calcified granulomas are scattered throughout the lungs bilaterally.  There are some opacities at the lung bases which could be due to infiltrates or atelectasis. The lungs are hyperexpanded with flattening of the hemidiaphragms. The cardiac silhouette and pulmonary vasculature are within normal limits. There is no significant pleural effusion or pneumothorax. Visualized portions of the upper abdomen are within normal limits. The osseous structures are intact. No acute fractures or suspicious osseous lesions. 1. Bibasilar opacities which may be due to infiltrates or atelectasis. 2. Hyperexpanded lungs with flattening of hemidiaphragms, findings which may be due to emphysematous changes. **This report has been created using voice recognition software. It may contain minor errors which are inherent in voice recognition technology. ** Final report electronically signed by Dr Jana Lemus on 11/21/2020 1:51 PM      Electronically signed by Tanner Vegas DO on 11/22/2020 at 10:33 AM

## 2020-11-22 NOTE — CONSULTS
[address] [place] [address]                                  CONSULTATION    PATIENT NAME: Kenna Silva                 :        1945  MED REC NO:   [id number]                           ROOM:  ACCOUNT NO:   [de-identified]                           ADMIT DATE: 2020  PROVIDER:     ROBERT Steel Go:  2020    REASON FOR CONSULTATION:  For further evaluation and treatment of severe  symptomatic GI bleeding. HISTORY OF PRESENT ILLNESS:  The patient is a 81-year-old pleasant  female, who has past medical history of hypothyroidism, glaucoma,  asthma, arthritis for which the patient has been taking Aleve who  presented to the emergency room with black melenic stools. The patient  started having black melenic stools for the last 2 days, complains of  abdominal pain, cramping type of pain, nausea, no vomiting. Denied any  fever, chills, night sweats. The patient felt lightheaded, dizzy at  home. The patient had several episodes of dark stools. In the  emergency room, initially hemoglobin was 9.7. The patient also had  seizure-like activity. CT scan of the head was done, which was  negative. CT scan of the abdomen and pelvis, which was reported by the  radiologist as severe diverticulosis without evidence of diverticulitis,  no acute abdominal or pelvic abdominal pathology. COVID-19 rapid was  nondetected. Her blood workup showed total protein 5.5, albumin 3.6,  total bilirubin 0.2, alkaline phosphatase 42, AST 19, ALT 16, D-dimer  1378. Troponin I less than 0.010. Initial blood workup when she  arrived to the emergency room was WBC 7.2, hemoglobin 9.8, hematocrit  30.4, MCV 93.8, platelet count 327,510. BUN 29, creatinine 0.3. Her  hemoglobin dropped down to 7.6. She is receiving 1 unit of packed red  blood cells. She is undergoing further evaluation.     PAST MEDICAL HISTORY:  Hypothyroidism, glaucoma, asthma. PAST SURGICAL HISTORY:  Transobturator PelviLace sling operation,  hysterectomy, inguinal hernia repair with mesh placement,  cholecystectomy. SOCIAL HISTORY:  Denied any tobacco, alcohol or illicit drug use. The  patient is . FAMILY HISTORY:  Lung cancer in her father. Rest of the family history  negative. REVIEW OF SYSTEMS:  14-point review of systems was reviewed. Pertinent  positives were mentioned in HPI and past medical history. MEDICATIONS ON ADMISSION:  Levothyroxine 112 mcg p.o. daily, ondansetron  Zofran 4 mg orally every 8 hours p.r.n., magnesium oxide 600 mg orally  at night, melatonin 3 tablets 3 mg at night, multivitamin 1 tablet  daily, cholecalciferol 2000 units orally daily, latanoprost or Xalatan  0.005% ophthalmic eyedrops at night, Aleve one to two tablets daily. PHYSICAL EXAMINATION:  VITAL SIGNS:  Temperature 98.7, pulse 94, respiratory rate 26, blood  pressure 104/60. GENERAL: A 70year-old pleasant female lying in bed, appears to be in no  acute distress. HEENT:  Normocephalic, atraumatic. Pupils are equal, round, react to  light. Anicteric sclera. Pale conjunctivae. No erythema of  oropharynx. NECK:  Supple. No JVD. No thyromegaly. CHEST:  No axillary or supraclavicular adenopathy. LUNGS:  Equal to expansion on inspection. [address] air entry bilaterally. HEART:  Regular. Normal S1 and S2. No S3.  ABDOMEN:  Soft. Mild epigastric discomfort on deep palpation. No  rebound or guarding. No palpable masses. No appreciable hernias. RECTAL:  Examination deferred. EXTREMITIES:  No clubbing, cyanosis or edema. SKIN:  Pale. NEUROLOGIC:  Generalized weakness. DIAGNOSTIC DATA:  As in HPI.     IMPRESSION:  3  A 70year-old pleasant female who has acute GI bleeding, I suspect  it is upper GI bleeding from use of Aleve and the patient is actively  bleeding and significant drop in hemoglobin since admission to the  emergency room from 9.7-7.7, most likely peptic ulcer disease, NSAID  induced gastropathy, less likely of lower GI bleeding. 2.  Hypothyroidism. PLAN:  My recommendations at this time is keep the patient nothing by  mouth, transfuse 1 unit of packed red blood cells. Continue IV Protonix  at 8 mg per hour, proceed with esophagogastroduodenoscopy. I have  discussed with the patient regarding the esophagogastroduodenoscopy and  bleeding control, its indications and complications including but not  limited to perforation, bleeding, infection, adverse reaction to  medicine and not able to stop bleeding, may require surgery, discussed  with the patient. The patient expressed her understanding. Further plans pending the above test results. Thank you  [facility] and Dr. Cortney Garrido for letting me see the patient. Do  not hesitate to call me if you have any questions. My recommendations  are above. Dayron Diaz M.D.    D: 11/22/2020 0:39:08       T: 11/22/2020 0:42:04     [email or web address]  Job#: [id number]     Doc#: [id number]    CC:  Sachi Rodríguez M.D.

## 2020-11-22 NOTE — ED NOTES
ED to inpatient nurses report    Chief Complaint   Patient presents with    Chest Pain    Rectal Bleeding      Present to ED from home  LOC: alert and orientated to name, place, date  Vital signs   Vitals:    11/21/20 2230 11/21/20 2336 11/21/20 2345 11/22/20 0021   BP: 91/62 (!) 101/58 (!) 101/58 104/60   Pulse: 104 99 105 94   Resp: 28 26 (!) 33 26   Temp:  98.7 °F (37.1 °C)     TempSrc:       SpO2: 98% 96% 95% 98%   Weight:       Height:          Oxygen Baseline no O2 needed    Current needs required none Bipap/Cpap No  LDAs:   Peripheral IV 11/21/20 Left;Posterior Hand (Active)       Peripheral IV 11/21/20 Right Hand (Active)       Peripheral IV 11/21/20 Right;Posterior Hand (Active)     Mobility: Requires assistance * 2  Pending ED orders:   Present condition: closely monitoring BP and LOC    Electronically signed by Tio Naranjo RN on 11/22/2020 at 12:34 AM       Tio Naranjo RN  11/22/20 0036

## 2020-11-22 NOTE — PROGRESS NOTES
**This is a Medical/ PA/ APRN Student Note and is charted for educational purposes. The non-physician staff attested note is not to be used for billing purposes or to guide patient care. Please see the physician modifications/ attestation for treatment plan/suggestions. This note has been reviewed and feedback has been provided to the student. *                                                       Hospitalist Progress Note      Patient:  Annamary Curling    Unit/Bed:4A-12/012-A  YOB: 1945  MRN: [id number]   Acct: [de-identified]   PCP: Temo Fisher MD  Date of Admission: 11/21/2020    Assessment/Plan:    1. GI Bleed :   11/22/20: Dr. Olvin Mckenzie did EGD last night finding mulitple ulcers in the antrum and duodenum, bleeding was partially controlled. On 8mg IV Protonix, change to PO before discharge. Last Hgb was 8.8 after transfuion of 1 PRBCs. Switch to clear liquids if next Hgb remains stable. Stay off NSAIDs. Will await results of biopsy to start triple therapy. 2. Acute Blood Loss Anemia:   11/22/20: Due to GI bleed, last Hgb 8.8. Transfuse if <7.0.   3. Seizure Episode:   11/22/20: No seizure like activity since arrival to hospital. CT head negative. Most likely due to stress of GI bleed coupled with phenergan. 4. Chest Pain: Resolved  11/22/20: Sounds GI related. Pain gone now. EKG and troponin were negative for myocardial ischemia. 5. Hypothyroidism:   11/22/20: Continue to home meds  6. Glaucoma:   11/22/20: Continue Home meds      Expected discharge date:  11/23/2020    Disposition:    [x] Home       [] TCU       [] Rehab       [] Psych       [] SNF       [] [place]       [] Other-    Chief Complaint: [address] Treatment Course: (Prior to today) 70 y.o. female who presented to [address] [place] [address] with the above complain.  She has hx of muscle cramp alleviated with alleeve, hx of abd hernia s/p surgery and hypothyroidism as well as remote PE 7. 7* 8.8*   HCT 30.4* 24.6* 26.7*     --  147     Recent Labs     11/21/20  1401 11/22/20  0350    141   K 4.4 4.0    108   CO2 25 23   BUN 29* 25*   CREATININE 0.3* 0.4   CALCIUM 8.8 7.9*     Recent Labs     11/21/20  1450   AST 19   ALT 16   BILIDIR <0.2   BILITOT 0.2*   ALKPHOS 42     No results for input(s): INR in the last 72 hours. No results for input(s): Chester Pace in the last 72 hours. Microbiology:    Blood culture #1: No results found for: [facility]    Blood culture #2:No results found for: [place]    Organism:  Lab Results   Component Value Date    ORG Escherichia coli 09/27/2020         Lab Results   Component Value Date    LABGRAM  03/05/2018     No segmented neutrophils observed. Rare epithelial cells observed. No organisms observed. MRSA culture only:No results found for: [address] [place] [address]    Urine culture: No results found for: LABURIN    Respiratory culture: No results found for: CULTRESP    Aerobic and Anaerobic :  Lab Results   Component Value Date    LABAERO No growth-preliminary  No growth   03/05/2018     No results found for: LABANAE    Urinalysis:      Lab Results   Component Value Date    NITRU Negative 10/15/2020    WBCUA 10-15 09/27/2020    BACTERIA MANY 09/27/2020    RBCUA 0-2 09/27/2020    BLOODU NEGATIVE 09/27/2020    GLUCOSEU NEGATIVE 09/27/2020       Radiology:  CT HEAD WO CONTRAST   Final Result   Impression:      No significant abnormalities. This document has been electronically signed by: Madyson Wallace MD on    11/21/2020 09:42 PM      All CT scans at this facility use dose modulation, iterative    reconstruction, and/or weight-based   dosing when appropriate to reduce radiation dose to as low as reasonably    achievable. CT ABDOMEN PELVIS WO CONTRAST Additional Contrast? None   Final Result   Severe diverticulosis without evidence of diverticulitis. No acute abdominal or pelvic abnormalities identified.             **This report has been diverticulosis without evidence of diverticulitis. No acute abdominal or pelvic abnormalities identified. **This report has been created using voice recognition software. It may contain minor errors which are inherent in voice recognition technology. ** Final report electronically signed by Dr. Zaheer Lloyd on 11/21/2020 6:17 PM    Ct Head Wo Contrast    Result Date: 11/21/2020  CT head without contrast Comparison: None Findings: Mild chronic ischemic white matter disease with mild volume loss. No intracranial mass, midline shift, hydrocephalus, or acute hemorrhage. Sinuses and mastoid air cells are clear. No acute bony abnormality. Impression: No significant abnormalities. This document has been electronically signed by: Anu Royal MD on 11/21/2020 09:42 PM All CT scans at this facility use dose modulation, iterative reconstruction, and/or weight-based dosing when appropriate to reduce radiation dose to as low as reasonably achievable. Xr Chest Portable    Result Date: 11/21/2020  PROCEDURE: XR CHEST PORTABLE CLINICAL INFORMATION: 72-year-old female with chest pain. COMPARISON: Chest x-ray 9/27/2020. TECHNIQUE: AP upright view of the chest was obtained. FINDINGS: Calcified granulomas are scattered throughout the lungs bilaterally. There are some opacities at the lung bases which could be due to infiltrates or atelectasis. The lungs are hyperexpanded with flattening of the hemidiaphragms. The cardiac silhouette and pulmonary vasculature are within normal limits. There is no significant pleural effusion or pneumothorax. Visualized portions of the upper abdomen are within normal limits. The osseous structures are intact. No acute fractures or suspicious osseous lesions. 1. Bibasilar opacities which may be due to infiltrates or atelectasis. 2. Hyperexpanded lungs with flattening of hemidiaphragms, findings which may be due to emphysematous changes.  **This report has been created using voice recognition software. It may contain minor errors which are inherent in voice recognition technology. ** Final report electronically signed by Dr Zenon Mazariegos on 11/21/2020 1:51 PM      Support Devices (date placed):  [] ETT []Oral / [] Nasal  [] Gastric Tube [] OG / [] NG    [] Central Venous Line (Specify Site):  [] Urinary Catheter  [] Arterial Line (Specify Site)  [x] Peripheral IV access  [] Other:    DVT prophylaxis: [] Lovenox                                 [] SCDs                                 [] SQ Heparin                                 [x] Encourage ambulation           [] Already on Anticoagulation     Code Status: Full Code    Tele:   [] yes             [x] no    Electronically signed by Alicia Craig on 11/22/2020 at 7:54 AM

## 2020-11-22 NOTE — ED NOTES
Pt more alert at this time, appears to be resting comfortably. Pt easily arousable to verbal stimuli, oriented x 4. Pt and pt's  updated on current POC. This RN assisted pt with teetee care and changing underwear. Will continue to monitor.       Tj Flores RN  11/21/20 2064

## 2020-11-23 LAB
ANION GAP SERPL CALCULATED.3IONS-SCNC: 7 MEQ/L (ref 8–16)
BUN BLDV-MCNC: 9 MG/DL (ref 7–22)
CALCIUM SERPL-MCNC: 7.8 MG/DL (ref 8.5–10.5)
CHLORIDE BLD-SCNC: 108 MEQ/L (ref 98–111)
CO2: 25 MEQ/L (ref 23–33)
CREAT SERPL-MCNC: 0.3 MG/DL (ref 0.4–1.2)
ERYTHROCYTE [DISTWIDTH] IN BLOOD BY AUTOMATED COUNT: 14.7 % (ref 11.5–14.5)
ERYTHROCYTE [DISTWIDTH] IN BLOOD BY AUTOMATED COUNT: 15 % (ref 11.5–14.5)
ERYTHROCYTE [DISTWIDTH] IN BLOOD BY AUTOMATED COUNT: 15.3 % (ref 11.5–14.5)
ERYTHROCYTE [DISTWIDTH] IN BLOOD BY AUTOMATED COUNT: 45.6 FL (ref 35–45)
ERYTHROCYTE [DISTWIDTH] IN BLOOD BY AUTOMATED COUNT: 51 FL (ref 35–45)
ERYTHROCYTE [DISTWIDTH] IN BLOOD BY AUTOMATED COUNT: 51.2 FL (ref 35–45)
GFR SERPL CREATININE-BSD FRML MDRD: > 90 ML/MIN/1.73M2
GLUCOSE BLD-MCNC: 88 MG/DL (ref 70–108)
HCT VFR BLD CALC: 20.7 % (ref 37–47)
HCT VFR BLD CALC: 24.4 % (ref 37–47)
HCT VFR BLD CALC: 25.6 % (ref 37–47)
HEMOGLOBIN: 6.8 GM/DL (ref 12–16)
HEMOGLOBIN: 8.4 GM/DL (ref 12–16)
HEMOGLOBIN: 9 GM/DL (ref 12–16)
MCH RBC QN AUTO: 30 PG (ref 26–33)
MCH RBC QN AUTO: 30.9 PG (ref 26–33)
MCH RBC QN AUTO: 31.6 PG (ref 26–33)
MCHC RBC AUTO-ENTMCNC: 32.8 GM/DL (ref 32.2–35.5)
MCHC RBC AUTO-ENTMCNC: 32.9 GM/DL (ref 32.2–35.5)
MCHC RBC AUTO-ENTMCNC: 36.9 GM/DL (ref 32.2–35.5)
MCV RBC AUTO: 85.6 FL (ref 81–99)
MCV RBC AUTO: 91.2 FL (ref 81–99)
MCV RBC AUTO: 94.1 FL (ref 81–99)
PATHOLOGIST REVIEW: ABNORMAL
PLATELET # BLD: 111 THOU/MM3 (ref 130–400)
PLATELET # BLD: 117 THOU/MM3 (ref 130–400)
PLATELET # BLD: 136 THOU/MM3 (ref 130–400)
PMV BLD AUTO: 11 FL (ref 9.4–12.4)
PMV BLD AUTO: 11.1 FL (ref 9.4–12.4)
PMV BLD AUTO: 12.1 FL (ref 9.4–12.4)
POTASSIUM REFLEX MAGNESIUM: 3.7 MEQ/L (ref 3.5–5.2)
RBC # BLD: 2.27 MILL/MM3 (ref 4.2–5.4)
RBC # BLD: 2.72 MILL/MM3 (ref 4.2–5.4)
RBC # BLD: 2.85 MILL/MM3 (ref 4.2–5.4)
SCAN OF BLOOD SMEAR: NORMAL
SODIUM BLD-SCNC: 140 MEQ/L (ref 135–145)
WBC # BLD: 4.2 THOU/MM3 (ref 4.8–10.8)
WBC # BLD: 6 THOU/MM3 (ref 4.8–10.8)
WBC # BLD: 9.4 THOU/MM3 (ref 4.8–10.8)

## 2020-11-23 PROCEDURE — 6360000002 HC RX W HCPCS: Performed by: INTERNAL MEDICINE

## 2020-11-23 PROCEDURE — 36430 TRANSFUSION BLD/BLD COMPNT: CPT

## 2020-11-23 PROCEDURE — P9016 RBC LEUKOCYTES REDUCED: HCPCS

## 2020-11-23 PROCEDURE — 36415 COLL VENOUS BLD VENIPUNCTURE: CPT

## 2020-11-23 PROCEDURE — 2580000003 HC RX 258: Performed by: INTERNAL MEDICINE

## 2020-11-23 PROCEDURE — 99232 SBSQ HOSP IP/OBS MODERATE 35: CPT | Performed by: INTERNAL MEDICINE

## 2020-11-23 PROCEDURE — 6370000000 HC RX 637 (ALT 250 FOR IP): Performed by: INTERNAL MEDICINE

## 2020-11-23 PROCEDURE — 80048 BASIC METABOLIC PNL TOTAL CA: CPT

## 2020-11-23 PROCEDURE — 2060000000 HC ICU INTERMEDIATE R&B

## 2020-11-23 PROCEDURE — 94760 N-INVAS EAR/PLS OXIMETRY 1: CPT

## 2020-11-23 PROCEDURE — C9113 INJ PANTOPRAZOLE SODIUM, VIA: HCPCS | Performed by: INTERNAL MEDICINE

## 2020-11-23 PROCEDURE — 85027 COMPLETE CBC AUTOMATED: CPT

## 2020-11-23 RX ORDER — 0.9 % SODIUM CHLORIDE 0.9 %
20 INTRAVENOUS SOLUTION INTRAVENOUS ONCE
Status: COMPLETED | OUTPATIENT
Start: 2020-11-23 | End: 2020-11-23

## 2020-11-23 RX ADMIN — Medication 10 ML: at 08:40

## 2020-11-23 RX ADMIN — SODIUM CHLORIDE 8 MG/HR: 9 INJECTION, SOLUTION INTRAVENOUS at 08:38

## 2020-11-23 RX ADMIN — ONDANSETRON 6 MG: 2 INJECTION INTRAMUSCULAR; INTRAVENOUS at 21:30

## 2020-11-23 RX ADMIN — SODIUM CHLORIDE 8 MG/HR: 9 INJECTION, SOLUTION INTRAVENOUS at 20:46

## 2020-11-23 RX ADMIN — LEVOTHYROXINE SODIUM 112 MCG: 112 TABLET ORAL at 08:42

## 2020-11-23 RX ADMIN — ACETAMINOPHEN 650 MG: 325 TABLET ORAL at 16:36

## 2020-11-23 RX ADMIN — MAGNESIUM GLUCONATE 500 MG ORAL TABLET 600 MG: 500 TABLET ORAL at 20:42

## 2020-11-23 RX ADMIN — LATANOPROST 1 DROP: 50 SOLUTION OPHTHALMIC at 20:42

## 2020-11-23 RX ADMIN — SODIUM CHLORIDE 20 ML: 9 INJECTION, SOLUTION INTRAVENOUS at 13:54

## 2020-11-23 RX ADMIN — Medication 3 MG: at 20:42

## 2020-11-23 ASSESSMENT — PAIN - FUNCTIONAL ASSESSMENT: PAIN_FUNCTIONAL_ASSESSMENT: ACTIVITIES ARE NOT PREVENTED

## 2020-11-23 ASSESSMENT — PAIN SCALES - GENERAL
PAINLEVEL_OUTOF10: 0
PAINLEVEL_OUTOF10: 3

## 2020-11-23 ASSESSMENT — PAIN DESCRIPTION - LOCATION
LOCATION: HEAD
LOCATION: HEAD

## 2020-11-23 ASSESSMENT — PAIN DESCRIPTION - PAIN TYPE
TYPE: ACUTE PAIN
TYPE: ACUTE PAIN

## 2020-11-23 ASSESSMENT — PAIN DESCRIPTION - PROGRESSION: CLINICAL_PROGRESSION: GRADUALLY WORSENING

## 2020-11-23 ASSESSMENT — PAIN DESCRIPTION - DESCRIPTORS
DESCRIPTORS: HEADACHE
DESCRIPTORS: ACHING;HEADACHE

## 2020-11-23 NOTE — PROGRESS NOTES
Hospitalist      Patient:  Eliud Reyes    Unit/Bed:4A-12/012-A  YOB: 1945  MRN: [id number]   Acct: [de-identified]     PCP: Ayesha Borges MD  Date of Admission: 11/21/2020        Assessment and Plan:        1. Upper GI bleed: Gastric ulcer, currently on IV PPI: Hemoglobin stable after transfusion. Biopsy results are pending may need to start triple therapy. 2. Anemia of acute blood loss: We will maintain hemoglobin greater than 8 due to comorbidities. 3. Questionable seizure episode may have been a vasovagal episode. 4. Hypothyroidism continue iv levothyroxine. Events noted in the last 24 hours: Hemoglobin had dropped to 6.8, will transfuse 1 unit of packed RBCs, patient still having black stools. Hypotensive during the night. CC:  melena    HPI: 68-year-old  female presents to [facility] for melena. Patient states that she has had black and bloody bowel movements. Over the last 2 days is a remote history of a PE but not on any current anticoagulation. Patient has abdominal pain is dull and diffuse in the epigastric area rates the pain as 5 out of 10 nonradiating. Last night she experienced some dizziness and diaphoresis when taking [place]. Patient states that she was severely unstable on her feet shortness of breath with any activity and has had. Clear emesis. She reports pain with each bowel movement. She denies any dysphagia, diet aphasia, hemoptysis, skin color changes, diarrhea, chest pain, changes in medication or diet. She denies fever chills or cough. Patient reports taking Aleve on average 2 times per week for leg cramps for about 2 years now. States the last time she was on Coumadin was in 2007. She has a past medical history of muscle cramps, abdominal hernia, status post surgery for the hernia hypothyroidism. ROS (14 point review of systems completed. Pertinent positives noted.  Otherwise ROS is negative) : Epigastric pain, melena, abdominal cramping    PMH:  Per HPI and       Diagnosis Date    Asthma     Glaucoma     Thyroid disease      SHX:        Procedure Laterality Date    CHOLECYSTECTOMY  2005    HERNIA REPAIR  [id number]    abdominal    HERNIA REPAIR Left 9/27/2020    HERNIA INGUINAL REPAIR WITH MESH performed by Shaggy Juárez MD at [facility]  5-13-12    transobturator pelvilace sling-Dr Asuncion Salvador     FHX:       Problem Relation Age of Onset   Rajan Ritter Cancer Father         lung     SOCHX:   Social History     Socioeconomic History    Marital status:      Spouse name: Not on file    Number of children: Not on file    Years of education: Not on file    Highest education level: Not on file   Occupational History    Not on file   Social Needs    Financial resource strain: Not on file    Food insecurity     Worry: Not on file     Inability: Not on file    Transportation needs     Medical: Not on file     Non-medical: Not on file   Tobacco Use    Smoking status: Former Smoker    Smokeless tobacco: Never Used   Substance and Sexual Activity    Alcohol use: Not Currently    Drug use: Never    Sexual activity: Yes     Partners: Male   Lifestyle    Physical activity     Days per week: Not on file     Minutes per session: Not on file    Stress: Not on file   Relationships    Social connections     Talks on phone: Not on file     Gets together: Not on file     Attends Bahai service: Not on file     Active member of club or organization: Not on file     Attends meetings of clubs or organizations: Not on file     Relationship status: Not on file    Intimate partner violence     Fear of current or ex partner: Not on file     Emotionally abused: Not on file     Physically abused: Not on file     Forced sexual activity: Not on file   Other Topics Concern    Not on file   Social History Narrative    Not on file      Allergies: Optiray [ioversol]; Codeine;  Dye [iodides]; Iodine; Pcn [penicillins]; Phenergan [promethazine]; and Sulfa antibiotics  Medications:     pantoprozole (PROTONIX) infusion 8 mg/hr (11/23/20 0923)      sodium chloride  20 mL Intravenous Once    latanoprost  1 drop Both Eyes Nightly    melatonin  3 mg Oral Nightly    sodium chloride flush  10 mL Intravenous 2 times per day    magnesium oxide  600 mg Oral Nightly    levothyroxine  112 mcg Oral Daily    0.9 % sodium chloride  250 mL Intravenous Once     Prior to Admission medications    Medication Sig Start Date End Date Taking? Authorizing Provider   levothyroxine (SYNTHROID) 112 MCG tablet Take 112 mcg by mouth Daily   Yes Historical Provider, MD   ondansetron (ZOFRAN) 4 MG tablet Take 1 tablet by mouth every 8 hours as needed for Nausea or Vomiting 9/30/20  Yes Dhiraj Alexander MD   Magnesium Oxide 600 MG CAPS Take 600 mg by mouth nightly    Yes Historical Provider, MD   Melatonin 3 MG TABS Take 3 mg by mouth nightly. Yes Historical Provider, MD   MULTIPLE VITAMINS PO Take 1 tablet by mouth daily    Yes Historical Provider, MD   Cholecalciferol (VITAMIN D) 2000 UNITS TABS Take 4,000 Units by mouth daily. Yes Historical Provider, MD   latanoprost (XALATAN) 0.005 % ophthalmic solution Place 1 drop into both eyes nightly    Yes Historical Provider, MD      PHYSICAL EXAM:    BP (!) 109/56   Pulse 97   Temp 97.8 °F (36.6 °C) (Oral)   Resp 16   Ht 5' 6\" (1.676 m)   Wt 186 lb 14.4 oz (84.8 kg)   SpO2 92%   BMI 30.17 kg/m²     General appearance:  No apparent distress, appears stated age and cooperative. HEENT:  Normal cephalic, atraumatic without obvious deformity. Pupils equal, round, and reactive to light. Extra ocular muscles intact. Conjunctivae/corneas clear. Neck: Supple, with full range of motion. no jugular venous distention. Trachea midline. no carotid bruits  Respiratory:  Normal respiratory effort. Clear to auscultation, bilaterally without Rales/Wheezes/Rhonchi.  Breath sounds equal bilaterally  Cardiovascular:  Regular rate and rhythm with normal S1/S2 without murmurs, rubs or gallops. PMI non displaced  Abdomen: Soft, mid epigastric tenderness with minimal palpation. Musculoskeletal:  No clubbing, cyanosis or edema bilaterally. Full range of motion without deformity. Skin: Skin color, texture, turgor normal.  No rashes or lesions, or suspicious lesions. Neurologic:  Neurovascularly intact without any focal sensory/motor deficits. Cranial nerves: II-XII intact, grossly non-focal.  Psychiatric:  Alert and oriented, thought content appropriate, normal insight  Capillary Refill: Brisk,< 2 seconds   Peripheral Pulses: +2 palpable, equal bilaterally upper and lower extremities  Lymphatics: no lymphadenopathy    Data: (All radiographs, tracings, PFTs, and imaging are personally viewed and interpreted unless otherwise noted).  H&H 8.8   Platelets 110   Creatinine 0.4  Recent Labs     11/22/20  1136 11/22/20 2020 11/23/20  0538   WBC 6.7 6.2 4.2*   HGB 7.5* 7.7* 6.8*   HCT 23.7* 23.3* 20.7*    136 117*     Recent Labs     11/21/20  1401 11/22/20  0350 11/23/20  0538    141 140   K 4.4 4.0 3.7    108 108   CO2 25 23 25   BUN 29* 25* 9   CREATININE 0.3* 0.4 0.3*   CALCIUM 8.8 7.9* 7.8*     Recent Labs     11/21/20  1450   AST 19   ALT 16   BILIDIR <0.2   BILITOT 0.2*   ALKPHOS 42     No results for input(s): INR in the last 72 hours. No results for input(s): [place] in the last 72 hours. Radiology reports-  Ct Abdomen Pelvis Wo Contrast Additional Contrast? None    Result Date: 11/21/2020  PROCEDURE: CT ABDOMEN PELVIS WO CONTRAST CLINICAL INFORMATION: Blood in Stool, Abdominal Pain . COMPARISON: No prior study. TECHNIQUE: 3 multiplanar noncontrast images of the abdomen and pelvis All CT scans at this facility use dose modulation, iterative reconstruction, and/or weight-based dosing when appropriate to reduce radiation dose to as low as reasonably achievable. FINDINGS: Limitations: Solid organ or hollow viscera evaluation limited without contrast. Lung bases No infiltrates or effusions. Undersurface of the heart unremarkable. Small hiatal hernia. Abdomen pelvis Liver, and spleen are unremarkable. Prior cholecystectomy. Right adrenal somewhat small in size. Left adrenals normal. Noncontrasted kidneys appear unremarkable. Large duodenal diverticulum is noted. Aorta shows minimal calcific plaquing. 2 adjacent ventral abdominal hernias containing mesenteric fat this is cephalad to previously repaired hernia. No adenopathy. Pelvis Normal appendix. No evidence of bowel obstruction. Extensive diverticulosis without evidence of diverticulitis. Urinary bladder is mildly distended. Uterus is surgically absent. Bones No suspicious bone lesions     Severe diverticulosis without evidence of diverticulitis. No acute abdominal or pelvic abnormalities identified. **This report has been created using voice recognition software. It may contain minor errors which are inherent in voice recognition technology. ** Final report electronically signed by Dr. Foster Brown on 11/21/2020 6:17 PM    Ct Head Wo Contrast    Result Date: 11/21/2020  CT head without contrast Comparison: None Findings: Mild chronic ischemic white matter disease with mild volume loss. No intracranial mass, midline shift, hydrocephalus, or acute hemorrhage. Sinuses and mastoid air cells are clear. No acute bony abnormality. Impression: No significant abnormalities. This document has been electronically signed by: Darleen Kaminski MD on 11/21/2020 09:42 PM All CT scans at this facility use dose modulation, iterative reconstruction, and/or weight-based dosing when appropriate to reduce radiation dose to as low as reasonably achievable. Xr Chest Portable    Result Date: 11/21/2020  PROCEDURE: XR CHEST PORTABLE CLINICAL INFORMATION: 37-year-old female with chest pain. COMPARISON: Chest x-ray 9/27/2020.  TECHNIQUE: AP upright view of the chest was obtained. FINDINGS: Calcified granulomas are scattered throughout the lungs bilaterally. There are some opacities at the lung bases which could be due to infiltrates or atelectasis. The lungs are hyperexpanded with flattening of the hemidiaphragms. The cardiac silhouette and pulmonary vasculature are within normal limits. There is no significant pleural effusion or pneumothorax. Visualized portions of the upper abdomen are within normal limits. The osseous structures are intact. No acute fractures or suspicious osseous lesions. 1. Bibasilar opacities which may be due to infiltrates or atelectasis. 2. Hyperexpanded lungs with flattening of hemidiaphragms, findings which may be due to emphysematous changes. **This report has been created using voice recognition software. It may contain minor errors which are inherent in voice recognition technology. ** Final report electronically signed by Dr Julisa Day on 11/21/2020 1:51 PM      Electronically signed by Raquel Jimenez DO on 11/23/2020 at 11:57 AM

## 2020-11-23 NOTE — PROGRESS NOTES
Patient refused medications during initial assessment, stating that she had already taken her home dose medications herself \"a little while ago. \" Patient states that she is okay and in her right state of mind. Patient is A&Ox4. This RN explained that she could still take her home medications if she would like, but we need to lock them away in the cabinet for safety reasons - overdose, potential medication interactions, etc. Patient states that she understands this, but continues to  adamantly refuse to handover medications stating that she is \"on a schedule and doesn't want to wait. \"    [address] notified. This RN told to just monitor patient for any abnormal behaviors that could be related to medication intoxication and avoid confrontation at this point; pass along to day shift. Will continue to monitor.

## 2020-11-23 NOTE — PROGRESS NOTES
Primary RN Delfina Kimble informed of critical hemoglobin and hematocrit results called by hematology.

## 2020-11-23 NOTE — PROGRESS NOTES
**This is a Medical/ PA/ APRN Student Note and is charted for educational purposes. The non-physician staff attested note is not to be used for billing purposes or to guide patient care. Please see the physician modifications/ attestation for treatment plan/suggestions. This note has been reviewed and feedback has been provided to the student. *                                                       Hospitalist Progress Note      Patient:  Tim Bearden    Unit/Bed:4A-12/012-A  YOB: 1945  MRN: [id number]   Acct: [de-identified]   PCP: Yves Toscano MD  Date of Admission: 11/21/2020    Assessment/Plan:    1. GI Bleed :   11/23/20: Hgb dropped to 6.8 this morning. Will give pt another unit of PRBCs. Last black stool was yesterday. Pt has intermittent heartburn, will add sucralfate if continues to bother her. May need to start triple therapy depending on results of biopsy. 2. Acute Blood Loss Anemia:   11/23/20: Due to GI bleed, last Hgb 6.8. Currently receiving another unit of PRBCs. Could be due to continued GI Bleed couple with hemodilution. Transfuse if <7.0.   3. Seizure Episode:   11/23/20: No seizure like activity since arrival to hospital. CT head negative. Most likely due to stress of GI bleed coupled with phenergan. 4. Chest Pain: Resolved  11/23/20: Sounds GI related. Pain gone now. EKG and troponin were negative for myocardial ischemia.      5. Hypothyroidism:   11/23/20: Continue to home meds  6. Glaucoma:                 11/23/20: Continue Home meds:         Expected discharge date:  11/24/2020    Disposition:    [x] Home       [] TCU       [] Rehab       [] Psych       [] SNF       [] [place]       [] Other-    Chief Complaint: Blood Stools    Hospital Treatment Course: (Prior to today) 70 y. o. female who presented to [address] [place] [address] with the above complain.  She has hx of muscle cramp alleviated with alleeve, hx of abd hernia s/p surgery and hypothyroidism as well as remote PE currently not on any anticoagulation. She reports experiencing severe nausea two days ago which was followed by dark stools since last night. Stools tarry and sometimes liquid to sticky. Associated with severe nausea and and cramp/pain. Pain is dull and diffuse but worse at epigastric. Rated as 5/10 and non radiating. Last night, she experienced dizziness and diaphoresis on taking alkaselzer. This morning, she was severely unstable on her feet, has SOB with mild activities and has a projectile clear emesis. She reports tenesmus with each BM. She denies dysphagia, odynophagia, hemoptysis, skin color change, diarrhea, chest pain, change in medication or diet, recent infection, fever, chills or food poisoning. Patient does report taking alleeve on average x2 weekly for leg cramps for about two years now. She denies GERD, weight loss or hx of colonoscopy. Last coumadin use was on 2007.     Addendum: patient still having melena and HB has dropped from 9.8 to 7.7. symptomatic. I had discussion with Dr. Liliya Fermin (GI) who agree to come in for emergent EGD. Proceed to transfuse and IVF as endoscopy staff comes in. CT head reviewed and negative for intracranial abnormality.     11/22/20: Pt is feeling better today. EGD last ngiht showed mulitple ulcers in antrum and duodenum. Not having CP. Last Hgb was 8.8, if Hgb this morning comes back stable then will switch to clear liquids. Will anticipate discharge tomorrow. Switch to PO protonix at that time. Will start triple therapy if biopsies come back positive. 11/23/20: Pt Hgb dropped to 6.8 this morning, most likely due to hemodilution because WBC and thrombocyte count also dropped. Will still give 1 unit PRBCs. Conitnue protonix. Last black stool was yesterday. Subjective (past 24 hours): Pt is having some lightheadeness, fatigue, and SOB with walking. Denies CP. Denies HA, blurry vision.  Does have some GERD like symptoms, denies vomiting and oriented, thought content appropriate, normal insight  Capillary Refill: Brisk,< 3 seconds   Peripheral Pulses: +2 palpable, equal bilaterally     Labs:   Recent Labs     11/22/20  1136 11/22/20 2020 11/23/20  0538   WBC 6.7 6.2 4.2*   HGB 7.5* 7.7* 6.8*   HCT 23.7* 23.3* 20.7*    136 117*     Recent Labs     11/21/20  1401 11/22/20  0350 11/23/20  0538    141 140   K 4.4 4.0 3.7    108 108   CO2 25 23 25   BUN 29* 25* 9   CREATININE 0.3* 0.4 0.3*   CALCIUM 8.8 7.9* 7.8*     Recent Labs     11/21/20  1450   AST 19   ALT 16   BILIDIR <0.2   BILITOT 0.2*   ALKPHOS 42     No results for input(s): INR in the last 72 hours. No results for input(s): Shellia Bugler in the last 72 hours. Microbiology:    Blood culture #1: No results found for: [facility]    Blood culture #2:No results found for: Matthew Gagnon    Organism:  Lab Results   Component Value Date    ORG Escherichia coli 09/27/2020         Lab Results   Component Value Date    LABGRAM  03/05/2018     No segmented neutrophils observed. Rare epithelial cells observed. No organisms observed. MRSA culture only:No results found for: [facility]    Urine culture: No results found for: LABURIN    Respiratory culture: No results found for: CULTRESP    Aerobic and Anaerobic :  Lab Results   Component Value Date    LABAERO No growth-preliminary  No growth   03/05/2018     No results found for: NATALYA    Urinalysis:      Lab Results   Component Value Date    NITRU Negative 10/15/2020    WBCUA 10-15 09/27/2020    BACTERIA MANY 09/27/2020    RBCUA 0-2 09/27/2020    BLOODU NEGATIVE 09/27/2020    GLUCOSEU NEGATIVE 09/27/2020       Radiology:  CT HEAD WO CONTRAST   Final Result   Impression:      No significant abnormalities.       This document has been electronically signed by: Latasha Washington MD on    11/21/2020 09:42 PM      All CT scans at this facility use dose modulation, iterative    reconstruction, and/or weight-based   dosing when appropriate to reduce radiation dose to as low as reasonably    achievable. CT ABDOMEN PELVIS WO CONTRAST Additional Contrast? None   Final Result   Severe diverticulosis without evidence of diverticulitis. No acute abdominal or pelvic abnormalities identified. **This report has been created using voice recognition software. It may contain minor errors which are inherent in voice recognition technology. **      Final report electronically signed by Dr. Leonard Mota on 11/21/2020 6:17 PM      XR CHEST PORTABLE   Final Result      1. Bibasilar opacities which may be due to infiltrates or atelectasis. 2. Hyperexpanded lungs with flattening of hemidiaphragms, findings which may be due to emphysematous changes. **This report has been created using voice recognition software. It may contain minor errors which are inherent in voice recognition technology. **      Final report electronically signed by Dr Clau Licona on 11/21/2020 1:51 PM        Ct Abdomen Pelvis Wo Contrast Additional Contrast? None    Result Date: 11/21/2020  PROCEDURE: CT ABDOMEN PELVIS WO CONTRAST CLINICAL INFORMATION: Blood in Stool, Abdominal Pain . COMPARISON: No prior study. TECHNIQUE: 3 multiplanar noncontrast images of the abdomen and pelvis All CT scans at this facility use dose modulation, iterative reconstruction, and/or weight-based dosing when appropriate to reduce radiation dose to as low as reasonably achievable. FINDINGS: Limitations: Solid organ or hollow viscera evaluation limited without contrast. Lung bases No infiltrates or effusions. Undersurface of the heart unremarkable. Small hiatal hernia. Abdomen pelvis Liver, and spleen are unremarkable. Prior cholecystectomy. Right adrenal somewhat small in size. Left adrenals normal. Noncontrasted kidneys appear unremarkable. Large duodenal diverticulum is noted. Aorta shows minimal calcific plaquing.  2 adjacent ventral abdominal hernias containing mesenteric fat this is cephalad to previously repaired hernia. No adenopathy. Pelvis Normal appendix. No evidence of bowel obstruction. Extensive diverticulosis without evidence of diverticulitis. Urinary bladder is mildly distended. Uterus is surgically absent. Bones No suspicious bone lesions     Severe diverticulosis without evidence of diverticulitis. No acute abdominal or pelvic abnormalities identified. **This report has been created using voice recognition software. It may contain minor errors which are inherent in voice recognition technology. ** Final report electronically signed by Dr. Anahy Pedersen on 11/21/2020 6:17 PM    Ct Head Wo Contrast    Result Date: 11/21/2020  CT head without contrast Comparison: None Findings: Mild chronic ischemic white matter disease with mild volume loss. No intracranial mass, midline shift, hydrocephalus, or acute hemorrhage. Sinuses and mastoid air cells are clear. No acute bony abnormality. Impression: No significant abnormalities. This document has been electronically signed by: Beata George MD on 11/21/2020 09:42 PM All CT scans at this facility use dose modulation, iterative reconstruction, and/or weight-based dosing when appropriate to reduce radiation dose to as low as reasonably achievable. Xr Chest Portable    Result Date: 11/21/2020  PROCEDURE: XR CHEST PORTABLE CLINICAL INFORMATION: 24-year-old female with chest pain. COMPARISON: Chest x-ray 9/27/2020. TECHNIQUE: AP upright view of the chest was obtained. FINDINGS: Calcified granulomas are scattered throughout the lungs bilaterally. There are some opacities at the lung bases which could be due to infiltrates or atelectasis. The lungs are hyperexpanded with flattening of the hemidiaphragms. The cardiac silhouette and pulmonary vasculature are within normal limits. There is no significant pleural effusion or pneumothorax. Visualized portions of the upper abdomen are within normal limits. The osseous structures are intact.  No acute fractures or suspicious osseous lesions. 1. Bibasilar opacities which may be due to infiltrates or atelectasis. 2. Hyperexpanded lungs with flattening of hemidiaphragms, findings which may be due to emphysematous changes. **This report has been created using voice recognition software. It may contain minor errors which are inherent in voice recognition technology. ** Final report electronically signed by Dr Ridge Gongora on 11/21/2020 1:51 PM      Support Devices (date placed):  [] ETT []Oral / [] Nasal  [] Gastric Tube [] OG / [] NG    [] Central Venous Line (Specify Site):  [] Urinary Catheter  [] Arterial Line (Specify Site)  [x] Peripheral IV access  [] Other:    DVT prophylaxis: [] Lovenox                                 [x] SCDs                                 [] SQ Heparin                                 [x] Encourage ambulation           [] Already on Anticoagulation     Code Status: Full Code    Tele:   [] yes             [x] no    Electronically signed by Sanna Nolan on 11/23/2020 at 9:41 AM

## 2020-11-24 VITALS
WEIGHT: 183.7 LBS | BODY MASS INDEX: 29.52 KG/M2 | RESPIRATION RATE: 18 BRPM | HEART RATE: 82 BPM | TEMPERATURE: 98 F | DIASTOLIC BLOOD PRESSURE: 64 MMHG | SYSTOLIC BLOOD PRESSURE: 110 MMHG | OXYGEN SATURATION: 95 % | HEIGHT: 66 IN

## 2020-11-24 LAB
ERYTHROCYTE [DISTWIDTH] IN BLOOD BY AUTOMATED COUNT: 14.8 % (ref 11.5–14.5)
ERYTHROCYTE [DISTWIDTH] IN BLOOD BY AUTOMATED COUNT: 14.8 % (ref 11.5–14.5)
ERYTHROCYTE [DISTWIDTH] IN BLOOD BY AUTOMATED COUNT: 14.9 % (ref 11.5–14.5)
ERYTHROCYTE [DISTWIDTH] IN BLOOD BY AUTOMATED COUNT: 49.6 FL (ref 35–45)
ERYTHROCYTE [DISTWIDTH] IN BLOOD BY AUTOMATED COUNT: 50.4 FL (ref 35–45)
ERYTHROCYTE [DISTWIDTH] IN BLOOD BY AUTOMATED COUNT: 51.8 FL (ref 35–45)
GLUCOSE BLD-MCNC: 93 MG/DL (ref 70–108)
HCT VFR BLD CALC: 24.9 % (ref 37–47)
HCT VFR BLD CALC: 26.3 % (ref 37–47)
HCT VFR BLD CALC: 27.2 % (ref 37–47)
HCT VFR BLD CALC: 27.6 % (ref 37–47)
HCT VFR BLD CALC: 27.8 % (ref 37–47)
HEMOGLOBIN: 8.2 GM/DL (ref 12–16)
HEMOGLOBIN: 8.7 GM/DL (ref 12–16)
HEMOGLOBIN: 8.9 GM/DL (ref 12–16)
HEMOGLOBIN: 9 GM/DL (ref 12–16)
HEMOGLOBIN: 9.1 GM/DL (ref 12–16)
MCH RBC QN AUTO: 30.8 PG (ref 26–33)
MCH RBC QN AUTO: 31.1 PG (ref 26–33)
MCH RBC QN AUTO: 31.6 PG (ref 26–33)
MCHC RBC AUTO-ENTMCNC: 32.9 GM/DL (ref 32.2–35.5)
MCHC RBC AUTO-ENTMCNC: 33 GM/DL (ref 32.2–35.5)
MCHC RBC AUTO-ENTMCNC: 33.1 GM/DL (ref 32.2–35.5)
MCV RBC AUTO: 93.6 FL (ref 81–99)
MCV RBC AUTO: 93.9 FL (ref 81–99)
MCV RBC AUTO: 95.8 FL (ref 81–99)
PLATELET # BLD: 141 THOU/MM3 (ref 130–400)
PLATELET # BLD: 147 THOU/MM3 (ref 130–400)
PLATELET # BLD: 154 THOU/MM3 (ref 130–400)
PMV BLD AUTO: 10.9 FL (ref 9.4–12.4)
PMV BLD AUTO: 11.3 FL (ref 9.4–12.4)
PMV BLD AUTO: 11.8 FL (ref 9.4–12.4)
RBC # BLD: 2.66 MILL/MM3 (ref 4.2–5.4)
RBC # BLD: 2.8 MILL/MM3 (ref 4.2–5.4)
RBC # BLD: 2.88 MILL/MM3 (ref 4.2–5.4)
WBC # BLD: 4.8 THOU/MM3 (ref 4.8–10.8)
WBC # BLD: 6 THOU/MM3 (ref 4.8–10.8)
WBC # BLD: 7.5 THOU/MM3 (ref 4.8–10.8)

## 2020-11-24 PROCEDURE — 2580000003 HC RX 258: Performed by: INTERNAL MEDICINE

## 2020-11-24 PROCEDURE — 6370000000 HC RX 637 (ALT 250 FOR IP): Performed by: INTERNAL MEDICINE

## 2020-11-24 PROCEDURE — 6360000002 HC RX W HCPCS: Performed by: INTERNAL MEDICINE

## 2020-11-24 PROCEDURE — 85014 HEMATOCRIT: CPT

## 2020-11-24 PROCEDURE — 99232 SBSQ HOSP IP/OBS MODERATE 35: CPT | Performed by: INTERNAL MEDICINE

## 2020-11-24 PROCEDURE — 85018 HEMOGLOBIN: CPT

## 2020-11-24 PROCEDURE — 82948 REAGENT STRIP/BLOOD GLUCOSE: CPT

## 2020-11-24 PROCEDURE — C9113 INJ PANTOPRAZOLE SODIUM, VIA: HCPCS | Performed by: INTERNAL MEDICINE

## 2020-11-24 PROCEDURE — 36415 COLL VENOUS BLD VENIPUNCTURE: CPT

## 2020-11-24 PROCEDURE — 85027 COMPLETE CBC AUTOMATED: CPT

## 2020-11-24 RX ORDER — PANTOPRAZOLE SODIUM 40 MG/1
40 TABLET, DELAYED RELEASE ORAL
Status: DISCONTINUED | OUTPATIENT
Start: 2020-11-24 | End: 2020-11-24 | Stop reason: HOSPADM

## 2020-11-24 RX ORDER — DOCUSATE SODIUM 100 MG/1
100 CAPSULE, LIQUID FILLED ORAL DAILY PRN
Status: DISCONTINUED | OUTPATIENT
Start: 2020-11-24 | End: 2020-11-24 | Stop reason: HOSPADM

## 2020-11-24 RX ORDER — PANTOPRAZOLE SODIUM 40 MG/1
40 TABLET, DELAYED RELEASE ORAL
Qty: 30 TABLET | Refills: 3 | Status: SHIPPED | OUTPATIENT
Start: 2020-11-24

## 2020-11-24 RX ORDER — PSEUDOEPHEDRINE HCL 30 MG
100 TABLET ORAL DAILY PRN
Qty: 60 CAPSULE | Refills: 0 | Status: SHIPPED | OUTPATIENT
Start: 2020-11-24

## 2020-11-24 RX ADMIN — LEVOTHYROXINE SODIUM 112 MCG: 112 TABLET ORAL at 07:00

## 2020-11-24 RX ADMIN — SODIUM CHLORIDE 8 MG/HR: 9 INJECTION, SOLUTION INTRAVENOUS at 05:19

## 2020-11-24 RX ADMIN — ACETAMINOPHEN 650 MG: 325 TABLET ORAL at 00:29

## 2020-11-24 RX ADMIN — DOCUSATE SODIUM 100 MG: 100 CAPSULE, LIQUID FILLED ORAL at 11:56

## 2020-11-24 RX ADMIN — PANTOPRAZOLE SODIUM 40 MG: 40 TABLET, DELAYED RELEASE ORAL at 17:52

## 2020-11-24 ASSESSMENT — PAIN DESCRIPTION - PROGRESSION: CLINICAL_PROGRESSION: GRADUALLY WORSENING

## 2020-11-24 ASSESSMENT — PAIN SCALES - GENERAL
PAINLEVEL_OUTOF10: 0
PAINLEVEL_OUTOF10: 0
PAINLEVEL_OUTOF10: 3

## 2020-11-24 ASSESSMENT — PAIN DESCRIPTION - FREQUENCY: FREQUENCY: INTERMITTENT

## 2020-11-24 ASSESSMENT — PAIN DESCRIPTION - DIRECTION: RADIATING_TOWARDS: NO

## 2020-11-24 ASSESSMENT — PAIN DESCRIPTION - PAIN TYPE: TYPE: ACUTE PAIN

## 2020-11-24 ASSESSMENT — PAIN DESCRIPTION - ONSET: ONSET: ON-GOING

## 2020-11-24 ASSESSMENT — PAIN DESCRIPTION - DESCRIPTORS: DESCRIPTORS: ACHING

## 2020-11-24 ASSESSMENT — PAIN DESCRIPTION - LOCATION: LOCATION: ABDOMEN

## 2020-11-24 ASSESSMENT — PAIN DESCRIPTION - ORIENTATION: ORIENTATION: MID

## 2020-11-24 ASSESSMENT — PAIN - FUNCTIONAL ASSESSMENT: PAIN_FUNCTIONAL_ASSESSMENT: PREVENTS OR INTERFERES SOME ACTIVE ACTIVITIES AND ADLS

## 2020-11-24 NOTE — CARE COORDINATION
11/24/20, 12:53 PM EST  From home with spouse; declines needs, F/U appt made. Patient goals/plan/ treatment preferences discussed by  and . Patient goals/plan/ treatment preferences reviewed with patient/ family. Patient/ family verbalize understanding of discharge plan and are in agreement with goal/plan/treatment preferences. Understanding was demonstrated using the teach back method. AVS provided by RN at time of discharge, which includes all necessary medical information pertaining to the patients current course of illness, treatment, post-discharge goals of care, and treatment preferences.         IMM Letter  IMM Letter given to Patient/Family/Significant other/Guardian/POA/by[de-identified]   IMM Letter date given[de-identified] 11/24/20  IMM Letter time given[de-identified] 1485

## 2020-11-24 NOTE — PROGRESS NOTES
status post surgery for the hernia hypothyroidism. ROS (14 point review of systems completed. Pertinent positives noted.  Otherwise ROS is negative) : Epigastric pain, melena, abdominal cramping    PMH:  Per HPI and       Diagnosis Date    Asthma     Glaucoma     Thyroid disease      SHX:        Procedure Laterality Date    CHOLECYSTECTOMY  2005    HERNIA REPAIR  [id number]    abdominal    HERNIA REPAIR Left 9/27/2020    HERNIA INGUINAL REPAIR WITH MESH performed by Rachelle Carreno MD at [facility]  5-13-12    transobturator pelvilace sling-Dr Fahad Barbosa UPPER GASTROINTESTINAL ENDOSCOPY N/A 11/21/2020    EGD CONTROL HEMORRHAGE performed by Marybeth Guthrie MD at [facility]     [address] [facility],[address]:       Problem Relation Age of Onset   [facility] Cancer Father         lung     SOCHX:   Social History     Socioeconomic History    Marital status:      Spouse name: Not on file    Number of children: Not on file    Years of education: Not on file    Highest education level: Not on file   Occupational History    Not on file   Social Needs    Financial resource strain: Not on file    Food insecurity     Worry: Not on file     Inability: Not on file   [employer] needs     Medical: Not on file     Non-medical: Not on file   Tobacco Use    Smoking status: Former Smoker    Smokeless tobacco: Never Used   Substance and Sexual Activity    Alcohol use: Not Currently    Drug use: Never    Sexual activity: Yes     Partners: Male   Lifestyle    Physical activity     Days per week: Not on file     Minutes per session: Not on file    Stress: Not on file   Relationships    Social connections     Talks on phone: Not on file     Gets together: Not on file     Attends Anabaptism service: Not on file     Active member of club or organization: Not on file     Attends meetings of clubs or organizations: Not on file     Relationship status: Not on file    Intimate partner violence Fear of current or ex partner: Not on file     Emotionally abused: Not on file     Physically abused: Not on file     Forced sexual activity: Not on file   Other Topics Concern    Not on file   Social History Narrative    Not on file      Allergies: Optiray [ioversol]; Codeine; Dye [iodides]; Iodine; Pcn [penicillins]; Phenergan [promethazine]; and Sulfa antibiotics  Medications:       pantoprazole  40 mg Oral BID AC    latanoprost  1 drop Both Eyes Nightly    melatonin  3 mg Oral Nightly    sodium chloride flush  10 mL Intravenous 2 times per day    magnesium oxide  600 mg Oral Nightly    levothyroxine  112 mcg Oral Daily    0.9 % sodium chloride  250 mL Intravenous Once     Prior to Admission medications    Medication Sig Start Date End Date Taking? Authorizing Provider   levothyroxine (SYNTHROID) 112 MCG tablet Take 112 mcg by mouth Daily   Yes Historical Provider, MD   ondansetron (ZOFRAN) 4 MG tablet Take 1 tablet by mouth every 8 hours as needed for Nausea or Vomiting 9/30/20  Yes Mattie Almeida MD   Magnesium Oxide 600 MG CAPS Take 600 mg by mouth nightly    Yes Historical Provider, MD   Melatonin 3 MG TABS Take 3 mg by mouth nightly. Yes Historical Provider, MD   MULTIPLE VITAMINS PO Take 1 tablet by mouth daily    Yes Historical Provider, MD   Cholecalciferol (VITAMIN D) 2000 UNITS TABS Take 4,000 Units by mouth daily. Yes Historical Provider, MD   latanoprost (XALATAN) 0.005 % ophthalmic solution Place 1 drop into both eyes nightly    Yes Historical Provider, MD      PHYSICAL EXAM:    /64   Pulse 82   Temp 98 °F (36.7 °C) (Oral)   Resp 18   Ht 5' 6\" (1.676 m)   Wt 183 lb 11.2 oz (83.3 kg)   SpO2 95%   BMI 29.65 kg/m²     General appearance:  No apparent distress, appears stated age and cooperative. HEENT:  Normal cephalic, atraumatic without obvious deformity. Pupils equal, round, and reactive to light. Extra ocular muscles intact. Conjunctivae/corneas clear.   Neck: Supple, with full range of motion. no jugular venous distention. Trachea midline. no carotid bruits  Respiratory:  Normal respiratory effort. Clear to auscultation, bilaterally without Rales/Wheezes/Rhonchi. Breath sounds equal bilaterally  Cardiovascular:  Regular rate and rhythm with normal S1/S2 without murmurs, rubs or gallops. PMI non displaced  Abdomen: Soft, mid epigastric tenderness with minimal palpation. Musculoskeletal:  No clubbing, cyanosis or edema bilaterally. Full range of motion without deformity. Skin: Skin color, texture, turgor normal.  No rashes or lesions, or suspicious lesions. Neurologic:  Neurovascularly intact without any focal sensory/motor deficits. Cranial nerves: II-XII intact, grossly non-focal.  Psychiatric:  Alert and oriented, thought content appropriate, normal insight  Capillary Refill: Brisk,< 2 seconds   Peripheral Pulses: +2 palpable, equal bilaterally upper and lower extremities  Lymphatics: no lymphadenopathy    Data: (All radiographs, tracings, PFTs, and imaging are personally viewed and interpreted unless otherwise noted).  H&H 8.8   Platelets 582   Creatinine 0.4  Recent Labs     11/23/20  1851 11/24/20  0114 11/24/20  0826   WBC 6.0 6.0 4.8   HGB 8.4* 9.1* 8.7*   HCT 25.6* 27.6* 26.3*    154 147     Recent Labs     11/21/20  1401 11/22/20  0350 11/23/20  0538    141 140   K 4.4 4.0 3.7    108 108   CO2 25 23 25   BUN 29* 25* 9   CREATININE 0.3* 0.4 0.3*   CALCIUM 8.8 7.9* 7.8*     Recent Labs     11/21/20  1450   AST 19   ALT 16   BILIDIR <0.2   BILITOT 0.2*   ALKPHOS 42     No results for input(s): INR in the last 72 hours. No results for input(s): Towana Ball in the last 72 hours. Radiology reports-  Ct Abdomen Pelvis Wo Contrast Additional Contrast? None    Result Date: 11/21/2020  PROCEDURE: CT ABDOMEN PELVIS WO CONTRAST CLINICAL INFORMATION: Blood in Stool, Abdominal Pain . COMPARISON: No prior study.  TECHNIQUE: 3 multiplanar noncontrast images of the abdomen and pelvis All CT scans at this facility use dose modulation, iterative reconstruction, and/or weight-based dosing when appropriate to reduce radiation dose to as low as reasonably achievable. FINDINGS: Limitations: Solid organ or hollow viscera evaluation limited without contrast. Lung bases No infiltrates or effusions. Undersurface of the heart unremarkable. Small hiatal hernia. Abdomen pelvis Liver, and spleen are unremarkable. Prior cholecystectomy. Right adrenal somewhat small in size. Left adrenals normal. Noncontrasted kidneys appear unremarkable. Large duodenal diverticulum is noted. Aorta shows minimal calcific plaquing. 2 adjacent ventral abdominal hernias containing mesenteric fat this is cephalad to previously repaired hernia. No adenopathy. Pelvis Normal appendix. No evidence of bowel obstruction. Extensive diverticulosis without evidence of diverticulitis. Urinary bladder is mildly distended. Uterus is surgically absent. Bones No suspicious bone lesions     Severe diverticulosis without evidence of diverticulitis. No acute abdominal or pelvic abnormalities identified. **This report has been created using voice recognition software. It may contain minor errors which are inherent in voice recognition technology. ** Final report electronically signed by Dr. Casey Pool on 11/21/2020 6:17 PM    Ct Head Wo Contrast    Result Date: 11/21/2020  CT head without contrast Comparison: None Findings: Mild chronic ischemic white matter disease with mild volume loss. No intracranial mass, midline shift, hydrocephalus, or acute hemorrhage. Sinuses and mastoid air cells are clear. No acute bony abnormality. Impression: No significant abnormalities.  This document has been electronically signed by: Nu Johnson MD on 11/21/2020 09:42 PM All CT scans at this facility use dose modulation, iterative reconstruction, and/or weight-based dosing when appropriate to reduce radiation dose to as low as reasonably achievable. Xr Chest Portable    Result Date: 11/21/2020  PROCEDURE: XR CHEST PORTABLE CLINICAL INFORMATION: 68-year-old female with chest pain. COMPARISON: Chest x-ray 9/27/2020. TECHNIQUE: AP upright view of the chest was obtained. FINDINGS: Calcified granulomas are scattered throughout the lungs bilaterally. There are some opacities at the lung bases which could be due to infiltrates or atelectasis. The lungs are hyperexpanded with flattening of the hemidiaphragms. The cardiac silhouette and pulmonary vasculature are within normal limits. There is no significant pleural effusion or pneumothorax. Visualized portions of the upper abdomen are within normal limits. The osseous structures are intact. No acute fractures or suspicious osseous lesions. 1. Bibasilar opacities which may be due to infiltrates or atelectasis. 2. Hyperexpanded lungs with flattening of hemidiaphragms, findings which may be due to emphysematous changes. **This report has been created using voice recognition software. It may contain minor errors which are inherent in voice recognition technology. ** Final report electronically signed by Dr Jessica Merrill on 11/21/2020 1:51 PM      Electronically signed by Conchita Hammans, DO on 11/24/2020 at 11:38 AM

## 2020-11-25 NOTE — PROGRESS NOTES
An attempt was made to call this pt's phone since they are on the [facility], for spiritual care. The pt did not answer the phone.

## 2020-11-29 NOTE — DISCHARGE SUMMARY
Hospital Medicine Discharge Summary      Patient Identification:   Graham Grider   : 1945  MRN: [id number]   Account: [de-identified]      Patient's PCP: Darrion Mariano MD    Admit Date: 2020     Discharge Date: 2020      Admitting Physician: Dola Galeazzi, MD     Discharge Physician: Conchita Hammans, DO     Discharge Diagnoses:see below    C/Mavis Mcmauns 1106 Problems    Diagnosis Date Noted    GI bleed [K92.2] 2020       The patient was seen and examined on day of discharge and this discharge summary is in conjunction with any daily progress note from day of discharge. Hospital Course:   Graham Grider is a 70 y.o. female admitted to [address] [place] [address] on 2020 for melena. 1. Upper GI bleed: Gastric ulcer, currently on IV PPI: Hemoglobin stable after transfusion. Biopsy results are pending may need to start triple therapy. 2. Anemia of acute blood loss: We will maintain hemoglobin greater than 8 due to comorbidities. 3. Questionable seizure episode may have been a vasovagal episode. 4. Hypothyroidism continue iv levothyroxine.     Events noted in the last 24 hours: Hemoglobin had dropped to 6.8, will transfuse 1 unit of packed RBCs, patient still having black stools. Hypotensive during the night.     2020: Patient medically stable, hemoglobins are currently stable, diet has been increased, will check hemoglobin in the afternoon if stable will discharge home.       Exam:     Vitals:  Vitals:    20 2043 20 2347 20 0327 20 1027   BP: 109/60 (!) 101/58 (!) 107/55 110/64   Pulse: 77 86 83 82   Resp: 18  18 18   Temp: 98.1 °F (36.7 °C)  98 °F (36.7 °C) 98 °F (36.7 °C)   TempSrc: Oral  Oral Oral   SpO2: 96%  97% 95%   Weight:   183 lb 11.2 oz (83.3 kg)    Height:         Weight: Weight: 183 lb 11.2 oz (83.3 kg)     24 hour intake/output:No intake or output data in the 24 hours ending 20 1124      General appearance:  No apparent distress, appears stated age and cooperative. HEENT:  Normal cephalic, atraumatic without obvious deformity. Pupils equal, round, and reactive to light. Extra ocular muscles intact. Conjunctivae/corneas clear. Neck: Supple, with full range of motion. No jugular venous distention. Trachea midline. Respiratory:  Normal respiratory effort. Clear to auscultation, bilaterally without Rales/Wheezes/Rhonchi. Cardiovascular:  Regular rate and rhythm with normal S1/S2 without murmurs, rubs or gallops. Abdomen: Soft, non-tender, non-distended with normal bowel sounds. Musculoskeletal:  No clubbing, cyanosis or edema bilaterally. Full range of motion without deformity. Skin: Skin color, texture, turgor normal.  No rashes or lesions. Neurologic:  Neurovascularly intact without any focal sensory/motor deficits. Cranial nerves: II-XII intact, grossly non-focal.  Psychiatric:  Alert and oriented, thought content appropriate, normal insight  Capillary Refill: Brisk,< 3 seconds   Peripheral Pulses: +2 palpable, equal bilaterally       Labs: For convenience and continuity at follow-up the following most recent labs are provided:      CBC:    Lab Results   Component Value Date    WBC 7.5 11/24/2020    HGB 8.9 11/24/2020    HCT 27.2 11/24/2020     11/24/2020       Renal:    Lab Results   Component Value Date     11/23/2020    K 3.7 11/23/2020     11/23/2020    CO2 25 11/23/2020    BUN 9 11/23/2020    CREATININE 0.3 11/23/2020    CALCIUM 7.8 11/23/2020         Significant Diagnostic Studies    Radiology:   CT HEAD WO CONTRAST   Final Result   Impression:      No significant abnormalities. This document has been electronically signed by: Carmelo Lopez MD on    11/21/2020 09:42 PM      All CT scans at this facility use dose modulation, iterative    reconstruction, and/or weight-based   dosing when appropriate to reduce radiation dose to as low as reasonably    achievable.          CT ABDOMEN 0.005 % ophthalmic solution  Place 1 drop into both eyes nightly              levothyroxine (SYNTHROID) 112 MCG tablet  Take 112 mcg by mouth Daily             Magnesium Oxide 600 MG CAPS  Take 600 mg by mouth nightly              Melatonin 3 MG TABS  Take 3 mg by mouth nightly. MULTIPLE VITAMINS PO  Take 1 tablet by mouth daily              ondansetron (ZOFRAN) 4 MG tablet  Take 1 tablet by mouth every 8 hours as needed for Nausea or Vomiting             pantoprazole (PROTONIX) 40 MG tablet  Take 1 tablet by mouth 2 times daily (before meals)                 Time Spent on discharge is more than 45 minutes in the examination, evaluation, counseling and review of medications and discharge plan. Signed: Thank you Ayesha Borges MD for the opportunity to be involved in this patient's care.     Electronically signed by Michelle Parson DO on 11/29/2020 at 11:24 AM

## 2021-05-12 ENCOUNTER — APPOINTMENT (OUTPATIENT)
Dept: CT IMAGING | Age: 76
End: 2021-05-12
Payer: MEDICARE

## 2021-05-12 ENCOUNTER — HOSPITAL ENCOUNTER (EMERGENCY)
Age: 76
Discharge: HOME | End: 2021-05-12
Attending: EMERGENCY MEDICINE
Payer: MEDICARE

## 2021-05-12 VITALS
SYSTOLIC BLOOD PRESSURE: 117 MMHG | DIASTOLIC BLOOD PRESSURE: 82 MMHG | WEIGHT: 175 LBS | RESPIRATION RATE: 18 BRPM | TEMPERATURE: 98.5 F | HEART RATE: 80 BPM | BODY MASS INDEX: 28.25 KG/M2 | OXYGEN SATURATION: 95 %

## 2021-05-12 DIAGNOSIS — R11.0 NAUSEA: Primary | ICD-10-CM

## 2021-05-12 DIAGNOSIS — R10.32 LEFT LOWER QUADRANT ABDOMINAL PAIN: ICD-10-CM

## 2021-05-12 LAB
ALBUMIN SERPL-MCNC: 3.9 G/DL (ref 3.5–5.1)
ALP BLD-CCNC: 49 U/L (ref 38–126)
ALT SERPL-CCNC: 21 U/L (ref 11–66)
ANION GAP SERPL CALCULATED.3IONS-SCNC: 9 MEQ/L (ref 8–16)
AST SERPL-CCNC: 25 U/L (ref 5–40)
BACTERIA: ABNORMAL /HPF
BASOPHILS # BLD: 0.1 %
BASOPHILS ABSOLUTE: 0 THOU/MM3 (ref 0–0.1)
BILIRUB SERPL-MCNC: 0.2 MG/DL (ref 0.3–1.2)
BILIRUBIN URINE: NEGATIVE
BLOOD, URINE: NEGATIVE
BUN BLDV-MCNC: 10 MG/DL (ref 7–22)
CALCIUM SERPL-MCNC: 9.4 MG/DL (ref 8.5–10.5)
CASTS 2: ABNORMAL /LPF
CASTS UA: ABNORMAL /LPF
CHARACTER, URINE: ABNORMAL
CHLORIDE BLD-SCNC: 101 MEQ/L (ref 98–111)
CO2: 30 MEQ/L (ref 23–33)
COLOR: YELLOW
CREAT SERPL-MCNC: 0.6 MG/DL (ref 0.4–1.2)
CRYSTALS, UA: ABNORMAL
EOSINOPHIL # BLD: 0.3 %
EOSINOPHILS ABSOLUTE: 0 THOU/MM3 (ref 0–0.4)
EPITHELIAL CELLS, UA: ABNORMAL /HPF
ERYTHROCYTE [DISTWIDTH] IN BLOOD BY AUTOMATED COUNT: 14.6 % (ref 11.5–14.5)
ERYTHROCYTE [DISTWIDTH] IN BLOOD BY AUTOMATED COUNT: 49.6 FL (ref 35–45)
GFR SERPL CREATININE-BSD FRML MDRD: > 90 ML/MIN/1.73M2
GLUCOSE BLD-MCNC: 149 MG/DL (ref 70–108)
GLUCOSE URINE: NEGATIVE MG/DL
HCT VFR BLD CALC: 39.9 % (ref 37–47)
HEMOGLOBIN: 13 GM/DL (ref 12–16)
IMMATURE GRANS (ABS): 0.02 THOU/MM3 (ref 0–0.07)
IMMATURE GRANULOCYTES: 0.3 %
KETONES, URINE: NEGATIVE
LEUKOCYTE ESTERASE, URINE: ABNORMAL
LIPASE: 36.4 U/L (ref 5.6–51.3)
LYMPHOCYTES # BLD: 21.8 %
LYMPHOCYTES ABSOLUTE: 1.5 THOU/MM3 (ref 1–4.8)
MCH RBC QN AUTO: 29.9 PG (ref 26–33)
MCHC RBC AUTO-ENTMCNC: 32.6 GM/DL (ref 32.2–35.5)
MCV RBC AUTO: 91.7 FL (ref 81–99)
MISCELLANEOUS 2: ABNORMAL
MONOCYTES # BLD: 6.3 %
MONOCYTES ABSOLUTE: 0.4 THOU/MM3 (ref 0.4–1.3)
NITRITE, URINE: NEGATIVE
NUCLEATED RED BLOOD CELLS: 0 /100 WBC
OSMOLALITY CALCULATION: 281.2 MOSMOL/KG (ref 275–300)
PH UA: 7.5 (ref 5–9)
PLATELET # BLD: 216 THOU/MM3 (ref 130–400)
PMV BLD AUTO: 11.1 FL (ref 9.4–12.4)
POTASSIUM REFLEX MAGNESIUM: 4.2 MEQ/L (ref 3.5–5.2)
PROTEIN UA: NEGATIVE
RBC # BLD: 4.35 MILL/MM3 (ref 4.2–5.4)
RBC URINE: ABNORMAL /HPF
RENAL EPITHELIAL, UA: ABNORMAL
SEG NEUTROPHILS: 71.2 %
SEGMENTED NEUTROPHILS ABSOLUTE COUNT: 4.8 THOU/MM3 (ref 1.8–7.7)
SODIUM BLD-SCNC: 140 MEQ/L (ref 135–145)
SPECIFIC GRAVITY, URINE: 1.01 (ref 1–1.03)
TOTAL PROTEIN: 6.6 G/DL (ref 6.1–8)
UROBILINOGEN, URINE: 0.2 EU/DL (ref 0–1)
WBC # BLD: 6.7 THOU/MM3 (ref 4.8–10.8)
WBC UA: ABNORMAL /HPF
YEAST: ABNORMAL

## 2021-05-12 PROCEDURE — 96375 TX/PRO/DX INJ NEW DRUG ADDON: CPT

## 2021-05-12 PROCEDURE — 36415 COLL VENOUS BLD VENIPUNCTURE: CPT

## 2021-05-12 PROCEDURE — 81001 URINALYSIS AUTO W/SCOPE: CPT

## 2021-05-12 PROCEDURE — 74176 CT ABD & PELVIS W/O CONTRAST: CPT

## 2021-05-12 PROCEDURE — 85025 COMPLETE CBC W/AUTO DIFF WBC: CPT

## 2021-05-12 PROCEDURE — 99284 EMERGENCY DEPT VISIT MOD MDM: CPT

## 2021-05-12 PROCEDURE — 6360000002 HC RX W HCPCS: Performed by: EMERGENCY MEDICINE

## 2021-05-12 PROCEDURE — 83690 ASSAY OF LIPASE: CPT

## 2021-05-12 PROCEDURE — 2580000003 HC RX 258: Performed by: EMERGENCY MEDICINE

## 2021-05-12 PROCEDURE — 96374 THER/PROPH/DIAG INJ IV PUSH: CPT

## 2021-05-12 PROCEDURE — 80053 COMPREHEN METABOLIC PANEL: CPT

## 2021-05-12 RX ORDER — ONDANSETRON 2 MG/ML
4 INJECTION INTRAMUSCULAR; INTRAVENOUS ONCE
Status: COMPLETED | OUTPATIENT
Start: 2021-05-12 | End: 2021-05-12

## 2021-05-12 RX ORDER — 0.9 % SODIUM CHLORIDE 0.9 %
1000 INTRAVENOUS SOLUTION INTRAVENOUS ONCE
Status: COMPLETED | OUTPATIENT
Start: 2021-05-12 | End: 2021-05-12

## 2021-05-12 RX ORDER — ONDANSETRON 4 MG/1
4 TABLET, ORALLY DISINTEGRATING ORAL EVERY 8 HOURS PRN
Qty: 12 TABLET | Refills: 0 | Status: SHIPPED | OUTPATIENT
Start: 2021-05-12 | End: 2022-05-02 | Stop reason: ALTCHOICE

## 2021-05-12 RX ORDER — METOCLOPRAMIDE HYDROCHLORIDE 5 MG/ML
10 INJECTION INTRAMUSCULAR; INTRAVENOUS ONCE
Status: COMPLETED | OUTPATIENT
Start: 2021-05-12 | End: 2021-05-12

## 2021-05-12 RX ADMIN — METOCLOPRAMIDE 10 MG: 5 INJECTION, SOLUTION INTRAMUSCULAR; INTRAVENOUS at 19:00

## 2021-05-12 RX ADMIN — ONDANSETRON 4 MG: 2 INJECTION INTRAMUSCULAR; INTRAVENOUS at 17:45

## 2021-05-12 RX ADMIN — SODIUM CHLORIDE 1000 ML: 9 INJECTION, SOLUTION INTRAVENOUS at 17:45

## 2021-05-12 ASSESSMENT — ENCOUNTER SYMPTOMS
ABDOMINAL PAIN: 1
CONSTIPATION: 0
BACK PAIN: 0
DIARRHEA: 0
EYE REDNESS: 0
SHORTNESS OF BREATH: 0
NAUSEA: 1
TROUBLE SWALLOWING: 0
COUGH: 0
VOMITING: 0

## 2021-05-12 ASSESSMENT — PAIN DESCRIPTION - LOCATION: LOCATION: ABDOMEN

## 2021-05-12 ASSESSMENT — PAIN DESCRIPTION - ORIENTATION: ORIENTATION: LEFT

## 2021-05-12 NOTE — ED PROVIDER NOTES
[facility] [address] [address] EMERGENCY DEPT    EMERGENCY MEDICINE     Pt Name: Brittany Melchor  MRN: [id number]  Liongfstacy 1945  Date of evaluation: 5/12/2021  Provider: Carrie Jane MD,     [address] [place] [address]       Chief Complaint   Patient presents with    Abdominal Pain    Nausea       HISTORY OF PRESENT ILLNESS    Brittany Melchor is a pleasant 78 y.o. female who presents to the emergency department from home abdominal pain. Patient states that she was here earlier however she left because the pain had resolved. She states the pain is in her left lower quadrant and was a sharp pain. The pain came back and that is why they came back to the emergency room for evaluation. She states that she was unable to straighten out because of the significant pain. She states she also has a lot of nausea but she has been unable to vomit. Patient is concerned because in September she had an incarcerated hernia that had to be operated on emergently. She also has a history of peptic ulcer disease that resulted in her getting multiple units of blood. She denies any bloody or unusual stool or bloody vomit. She has not had any fever, chills, chest pain, shortness of breath. Triage notes and Nursing notes were reviewed by myself. Any discrepancies are addressed above.     PAST MEDICAL HISTORY     Past Medical History:   Diagnosis Date    Asthma     Glaucoma     Thyroid disease        SURGICAL HISTORY       Past Surgical History:   Procedure Laterality Date    CHOLECYSTECTOMY  2005    HERNIA REPAIR  [id number]    abdominal    HERNIA REPAIR Left 9/27/2020    HERNIA INGUINAL REPAIR WITH MESH performed by Jayshree Key MD at [facility]  5-13-12    transobturator pelvilace sling-Dr Caro Moscoso UPPER GASTROINTESTINAL ENDOSCOPY N/A 11/21/2020    EGD CONTROL HEMORRHAGE performed by Keila Brunner MD at [facility]       Discharge Medication List as of 5/12/2021  6:30 PM      CONTINUE these medications which have NOT CHANGED    Details   docusate sodium (COLACE, DULCOLAX) 100 MG CAPS Take 100 mg by mouth daily as needed for Constipation, Disp-60 capsule,R-0Normal      pantoprazole (PROTONIX) 40 MG tablet Take 1 tablet by mouth 2 times daily (before meals), Disp-30 tablet,R-3Normal      levothyroxine (SYNTHROID) 112 MCG tablet Take 112 mcg by mouth DailyHistorical Med      ondansetron (ZOFRAN) 4 MG tablet Take 1 tablet by mouth every 8 hours as needed for Nausea or Vomiting, Disp-20 tablet,R-0Normal      Magnesium Oxide 600 MG CAPS Take 600 mg by mouth nightly Historical Med      Melatonin 3 MG TABS Take 3 mg by mouth nightly. MULTIPLE VITAMINS PO Take 1 tablet by mouth daily Historical Med      Cholecalciferol (VITAMIN D) 2000 UNITS TABS Take 4,000 Units by mouth daily.       latanoprost (XALATAN) 0.005 % ophthalmic solution Place 1 drop into both eyes nightly Historical Med             ALLERGIES     Optiray [ioversol], Codeine, Dye [iodides], Iodine, Pcn [penicillins], Phenergan [promethazine], and Sulfa antibiotics    FAMILY HISTORY       Family History   Problem Relation Age of Onset    Cancer Father         lung        SOCIAL HISTORY       Social History     Socioeconomic History    Marital status:      Spouse name: Not on file    Number of children: Not on file    Years of education: Not on file    Highest education level: Not on file   Occupational History    Not on file   Social Needs    Financial resource strain: Not on file    Food insecurity     Worry: Not on file     Inability: Not on file   [employer] needs     Medical: Not on file     Non-medical: Not on file   Tobacco Use    Smoking status: Former Smoker    Smokeless tobacco: Never Used   Substance and Sexual Activity    Alcohol use: Not Currently    Drug use: Never    Sexual activity: Yes     Partners: Male   Lifestyle    Physical activity     Days per week: Not on file     Minutes per session: Not on file    Stress: Not on file   Relationships    Social connections     Talks on phone: Not on file     Gets together: Not on file     Attends Nondenominational service: Not on file     Active member of club or organization: Not on file     Attends meetings of clubs or organizations: Not on file     Relationship status: Not on file    Intimate partner violence     Fear of current or ex partner: Not on file     Emotionally abused: Not on file     Physically abused: Not on file     Forced sexual activity: Not on file   Other Topics Concern    Not on file   Social History Narrative    Not on file       REVIEW OF SYSTEMS     Review of Systems   Constitutional: Negative for fatigue and fever. HENT: Negative for congestion and trouble swallowing. Eyes: Negative for redness. Respiratory: Negative for cough and shortness of breath. Cardiovascular: Negative for chest pain. Gastrointestinal: Positive for abdominal pain and nausea. Negative for constipation, diarrhea and vomiting. Genitourinary: Negative for dysuria. Musculoskeletal: Negative for back pain. Skin: Negative for rash. Allergic/Immunologic: Negative for immunocompromised state. Neurological: Negative for light-headedness. Hematological: Does not bruise/bleed easily. Except as noted above the remainder of the review of systems was reviewed and is. PHYSICAL EXAM    (up to 7 for level 4, 8 or more for level 5)     ED Triage Vitals [05/12/21 1624]   BP Temp Temp Source Pulse Resp SpO2 Height Weight   130/73 98.5 °F (36.9 °C) Oral 83 18 92 % -- 175 lb (79.4 kg)       Physical Exam  Vitals signs and nursing note reviewed. Exam conducted with a chaperone present. Constitutional:       General: She is not in acute distress. Appearance: She is normal weight. She is not ill-appearing. HENT:      Head: Normocephalic and atraumatic. Nose: Nose normal. No congestion.       Mouth/Throat:      Mouth: Mucous membranes are moist.      Pharynx: Oropharynx is clear. No oropharyngeal exudate or posterior oropharyngeal erythema. Eyes:      Extraocular Movements: Extraocular movements intact. Pupils: Pupils are equal, round, and reactive to light. Cardiovascular:      Rate and Rhythm: Normal rate and regular rhythm. Pulses: Normal pulses. Heart sounds: No murmur. No friction rub. Pulmonary:      Effort: Pulmonary effort is normal. No respiratory distress. Breath sounds: Normal breath sounds. No wheezing. Abdominal:      General: Abdomen is flat. Bowel sounds are normal. There is no distension. Palpations: Abdomen is soft. Tenderness: There is no abdominal tenderness. There is no right CVA tenderness, left CVA tenderness, guarding or rebound. Negative signs include Godfrey's sign and McBurney's sign. Musculoskeletal: Normal range of motion. Skin:     General: Skin is warm and dry. Capillary Refill: Capillary refill takes less than 2 seconds. Neurological:      General: No focal deficit present. Mental Status: She is alert and oriented to person, place, and time. DIAGNOSTIC RESULTS     EKG:(none if blank)  All EKG's are interpreted by [facility] who either signs or Co-signs this chart in the absence of a cardiologist.        RADIOLOGY: (none if blank)   Interpretation per the Radiologistbelow, if available at the time of this note:    CT ABDOMEN PELVIS WO CONTRAST Additional Contrast? None   Final Result   No acute abdominal/pelvic findings. Diffuse distal colonic and sigmoid diverticulosis with no evidence of    acute diverticulitis. At L5-S1 moderate degenerative disc/endplate spondylitic changes.       This document has been electronically signed by: Ren Mayo MD on    05/12/2021 06:17 PM      All CTs at this facility use dose modulation techniques and iterative    reconstructions, and/or weight-based dosing   when appropriate to reduce radiation to a low as reasonably achievable. LABS:  Labs Reviewed   CBC WITH AUTO DIFFERENTIAL - Abnormal; Notable for the following components:       Result Value    RDW-CV 14.6 (*)     RDW-SD 49.6 (*)     All other components within normal limits   COMPREHENSIVE METABOLIC PANEL W/ REFLEX TO MG FOR LOW K - Abnormal; Notable for the following components:    Glucose 149 (*)     Total Bilirubin 0.2 (*)     All other components within normal limits   URINE WITH REFLEXED MICRO - Abnormal; Notable for the following components:    Leukocyte Esterase, Urine TRACE (*)     Character, Urine CLOUDY (*)     All other components within normal limits   LIPASE   ANION GAP   GLOMERULAR FILTRATION RATE, ESTIMATED   OSMOLALITY       All other labs were within normal range or not returned as of this dictation. Please note, any cultures that may have been sent were not resulted at the time of this patient visit. EMERGENCY DEPARTMENT COURSE andMedical Decision Making:     MDM  Number of Diagnoses or Management Options  Diagnosis management comments: 62-year-old female presents emergency room for abdominal pain. Differential includes obstruction, peptic ulcer disease, diverticulitis, colitis, pancreatitis, cholecystitis, appendicitis, recurrent hernia, UTI. She is in no discomfort at this time. We will give her Zofran and fluids for her nausea. Will evaluate with CBC, CMP, lipase, UA, CT abdomen pelvis. She is allergic to the contrast dye. Given this will evaluate with a noncontrast CT.  /  ED Course as of May 13 0100   Wed May 12, 2021   1827 CT does not show any evidence of acute intra-abdominal process. She does have a distended stomach with fluid and debris in it. Will give Reglan for motility agent.   Will discharge home with Zofran.    [DD]      ED Course User Index  [DD] Nina Alanis MD         The patient was evaluated during the global COVID-19 pandemic, and that diagnosis was considered upon their initial presentation. Their evaluation, treatment and testing was consistent with current guidelines for patients who present with complaints or symptoms that may be related to COVID-19. Strict returnprecautions and follow up instructions were discussed with the patient with which the patient agrees        ED Medications administered this visit:    Medications   0.9 % sodium chloride bolus (0 mLs Intravenous Stopped 5/12/21 1845)   ondansetron (ZOFRAN) injection 4 mg (4 mg Intravenous Given 5/12/21 1745)   metoclopramide (REGLAN) injection 10 mg (10 mg Intravenous Given 5/12/21 1900)         Procedures: (None if blank)       CLINICAL       1. Nausea    2.  Left lower quadrant abdominal pain          DISPOSITION/PLAN   DISPOSITION Decision To Discharge 05/12/2021 06:27:50 PM      PATIENT REFERRED TO:  Sandie Stark MD  [facility] [address]  [phone]    Schedule an appointment as soon as possible for a visit   If symptoms worsen      DISCHARGE MEDICATIONS:  Discharge Medication List as of 5/12/2021  6:30 PM      START taking these medications    Details   ondansetron (ZOFRAN ODT) 4 MG disintegrating tablet Take 1 tablet by mouth every 8 hours as needed for Nausea or Vomiting, Disp-12 tablet, R-0Normal                    (Please note that portions of this note were completed with a voice recognition program.  Efforts were made to edit the dictations but occasionallywords are mis-transcribed.)      Electronically signed by Irena Unger MD on 5/12/21 at 6:23 PM EDT    Attending Physician, Emergency Department       Lark Sandhoff, MD  05/13/21 5476

## 2022-04-25 ENCOUNTER — OFFICE VISIT (OUTPATIENT)
Dept: SURGERY | Age: 77
End: 2022-04-25
Payer: MEDICARE

## 2022-04-25 VITALS
BODY MASS INDEX: 29.09 KG/M2 | RESPIRATION RATE: 18 BRPM | HEIGHT: 66 IN | SYSTOLIC BLOOD PRESSURE: 110 MMHG | DIASTOLIC BLOOD PRESSURE: 68 MMHG | HEART RATE: 95 BPM | WEIGHT: 181 LBS | OXYGEN SATURATION: 98 % | TEMPERATURE: 97.8 F

## 2022-04-25 DIAGNOSIS — R10.2 PELVIC PAIN: ICD-10-CM

## 2022-04-25 DIAGNOSIS — K43.9 VENTRAL HERNIA WITHOUT OBSTRUCTION OR GANGRENE: Primary | ICD-10-CM

## 2022-04-25 PROCEDURE — 99214 OFFICE O/P EST MOD 30 MIN: CPT | Performed by: SURGERY

## 2022-04-25 NOTE — PROGRESS NOTES
Brody Aceves MD   General Surgery  New Patient Evaluation in Office  Pt Name: Brady Olmstead  Date of Birth 1945   Today's Date: 4/25/2022  Medical Record Number: [id number]  Referring Provider: Evan Zazueta  Primary Care Provider: Beatriz Sunshine MD  Chief Complaint:  Chief Complaint   Patient presents with   Em Surgical Consult     Est patient-referred by Anne Mejía. Wong-c/o lower abd pain-abdominal wall hernia       ASSESSMENT      1. Ventral hernia without obstruction or gangrene    2. Pelvic pain    3. History of asthma  4. History of prior left inguinal hernia repair without clinical evidence of recurrence   5. History of peptic ulcer disease  6. Constipation  7. Diverticular disease  PLANS      1.  1.  Patient does not have a discretely palpable recurrent inguinal hernia. 2.  Small periumbilical hernia on previous CT of the abdomen in May 2021. Clinically palpable. Findings do not really explain her symptoms. 3.  CT scan of the abdomen and pelvis as follow-up evaluation prior to considering surgical intervention. No palpable recurrent inguinal hernia evident on today's examination. Patient complains mostly of pelvic discomfort. 4.  Follow-up surgical clinic after CT imaging of the abdomen and pelvis  Eleonora Green is a 78y.o. year old female who is presenting today in the office for evaluation of a new problem with complaints of pelvic discomfort. In 2020 I repaired a left inguinal hernia which was incarcerated and contain omental fat. She recently has complained of pelvic discomfort. CT scan performed last year revealed small fascial defect periumbilical area. nEedina Tejeda reports fatigue mid abdominal pain and some constipation. She has seen GI and had a more recent history of an ulcer and anemia. She continues to take Protonix. She also complains of some fatigue and shortness of breath. She was seen at her PCP office for routine yearly follow-up.   With these complaints she was sent to our office for evaluation of her hernias and abdominal symptoms. Past Medical History  Past Medical History:   Diagnosis Date    Asthma     Glaucoma     Thyroid disease        Past Surgical History  Past Surgical History:   Procedure Laterality Date    CHOLECYSTECTOMY  2005    HERNIA REPAIR  [id number]    abdominal    HERNIA REPAIR Left 9/27/2020    HERNIA INGUINAL REPAIR WITH MESH performed by Rachelle Carreno MD at [facility]  5-13-12    transobturator pelvilace sling-Dr Fahad Barbosa UPPER GASTROINTESTINAL ENDOSCOPY N/A 11/21/2020    EGD CONTROL HEMORRHAGE performed by Marybeth Guthrie MD at [facility]       Medications  Current Outpatient Medications   Medication Sig Dispense Refill    docusate sodium (COLACE, DULCOLAX) 100 MG CAPS Take 100 mg by mouth daily as needed for Constipation 60 capsule 0    levothyroxine (SYNTHROID) 112 MCG tablet Take 112 mcg by mouth Daily      Magnesium Oxide 600 MG CAPS Take 600 mg by mouth nightly       Melatonin 3 MG TABS Take 3 mg by mouth nightly.  MULTIPLE VITAMINS PO Take 1 tablet by mouth daily       Cholecalciferol (VITAMIN D) 2000 UNITS TABS Take 4,000 Units by mouth daily.  latanoprost (XALATAN) 0.005 % ophthalmic solution Place 1 drop into both eyes nightly       ondansetron (ZOFRAN ODT) 4 MG disintegrating tablet Take 1 tablet by mouth every 8 hours as needed for Nausea or Vomiting (Patient not taking: Reported on 4/25/2022) 12 tablet 0    pantoprazole (PROTONIX) 40 MG tablet Take 1 tablet by mouth 2 times daily (before meals) (Patient not taking: Reported on 4/25/2022) 30 tablet 3    ondansetron (ZOFRAN) 4 MG tablet Take 1 tablet by mouth every 8 hours as needed for Nausea or Vomiting (Patient not taking: Reported on 4/25/2022) 20 tablet 0     No current facility-administered medications for this visit.      Allergies     Allergies   Allergen Reactions    Optiray [Ioversol] Anaphylaxis Throat swelling code blue was called and patient also develop hives    Codeine     Dye [Iodides]     Iodine     Pcn [Penicillins]     Phenergan [Promethazine] Other (See Comments)     seizure    Sulfa Antibiotics        Family History  Family History   Problem Relation Age of Onset    Cancer Father         lung       SocialHistory  Social History     Socioeconomic History    Marital status:      Spouse name: Not on file    Number of children: Not on file    Years of education: Not on file    Highest education level: Not on file   Occupational History    Not on file   Tobacco Use    Smoking status: Former Smoker    Smokeless tobacco: Never Used   Vaping Use    Vaping Use: Never used   Substance and Sexual Activity    Alcohol use: Not Currently    Drug use: Never    Sexual activity: Yes     Partners: Male   Other Topics Concern    Not on file   Social History Narrative    Not on file     Social Determinants of Health     Financial Resource Strain:     Difficulty of Paying Living Expenses: Not on file   Food Insecurity:     Worried About [address] in the Last Year: Not on file    Roque of Food in the Last Year: Not on file   Transportation Needs:     Lack of Transportation (Medical): Not on file    Lack of Transportation (Non-Medical):  Not on file   Physical Activity:     Days of Exercise per Week: Not on file    Minutes of Exercise per Session: Not on file   Stress:     Feeling of Stress : Not on file   Social Connections:     Frequency of Communication with Friends and Family: Not on file    Frequency of Social Gatherings with Friends and Family: Not on file    Attends Moravian Services: Not on file    Active Member of Clubs or Organizations: Not on file    Attends Club or Organization Meetings: Not on file    Marital Status: Not on file   Intimate Partner Violence:     Fear of Current or Ex-Partner: Not on file    Emotionally Abused: Not on file    Physically Abused: Not on file    Sexually Abused: Not on file   Housing Stability:     Unable to Pay for Housing in the Last Year: Not on file    Number of Places Lived in the Last Year: Not on file    Unstable Housing in the Last Year: Not on file           Review of Systems  Review of Systems   Constitutional: Positive for fatigue. Negative for chills, fever and unexpected weight change. HENT: Negative for sore throat, trouble swallowing and voice change. Eyes: Negative for visual disturbance. Respiratory: Positive for shortness of breath. Negative for cough and wheezing. Cardiovascular: Negative for chest pain and palpitations. Gastrointestinal: Positive for abdominal pain and constipation. Negative for blood in stool, nausea and vomiting. Endocrine: Negative for cold intolerance, heat intolerance and polydipsia. Genitourinary: Negative for dysuria, flank pain and hematuria. Musculoskeletal: Negative for arthralgias, gait problem, joint swelling and myalgias. Skin: Negative for color change and rash. Allergic/Immunologic: Negative for immunocompromised state. Neurological: Negative for dizziness, tremors, seizures and speech difficulty. Hematological: Does not bruise/bleed easily. Psychiatric/Behavioral: Negative for behavioral problems and confusion. OBJECTIVE     /68   Pulse 95   Temp 97.8 °F (36.6 °C) (Temporal)   Resp 18   Ht 5' 6\" (1.676 m)   Wt 181 lb (82.1 kg)   SpO2 98%   BMI 29.21 kg/m²      Physical Exam  Vitals reviewed. Constitutional:       Appearance: She is well-developed. HENT:      Head: Normocephalic and atraumatic. Eyes:      Pupils: Pupils are equal, round, and reactive to light. Neck:      Thyroid: No thyromegaly. Vascular: No JVD. Trachea: No tracheal deviation. Cardiovascular:      Rate and Rhythm: Normal rate. Heart sounds: Normal heart sounds. Pulmonary:      Effort: No respiratory distress.       Breath sounds: Normal atherosclerotic calcification of the normal caliber abdominal    aorta.       Pelvic contents:  Prior hysterectomy.  Normal appearing distended urinary    bladder.       No acute fracture.  Moderate degenerative disc disease changes at L5-S1    with vacuum phenomenon.  Lower lumbosacral facet arthrosis.  Lower    thoracic degenerative endplate changes.  Mild/moderate left hip arthrosis.       A small umbilical/paraumbilical fat-containing hernia.  At the level of    the umbilicus midline ventral abdominal wall defect with mesenteric fat    protrusion anteriorly.           Impression   No acute abdominal/pelvic findings.       Diffuse distal colonic and sigmoid diverticulosis with no evidence of    acute diverticulitis.       At L5-S1 moderate degenerative disc/endplate spondylitic changes.       This document has been electronically signed by: Zachariah Monk MD on    05/12/2021 06:17 PM       All CTs at this facility use dose modulation techniques and iterative    reconstructions, and/or weight-based dosing   when appropriate to reduce radiation to a low as reasonably achievable.         Very small possible periumbilical hernia without herniating contents

## 2022-04-26 ENCOUNTER — TELEPHONE (OUTPATIENT)
Dept: SURGERY | Age: 77
End: 2022-04-26

## 2022-04-26 NOTE — TELEPHONE ENCOUNTER
[employer] medicare prior Favian Chin goes through [facility] side of [facility] [place]    Was able to get approved.  Mat Wisdom is in referral

## 2022-04-26 NOTE — TELEPHONE ENCOUNTER
Upon further review CT scan abd/pelvis does require prior auth through [address] [place] [address] submitted, approved and scanned in [employer]

## 2022-04-28 ASSESSMENT — ENCOUNTER SYMPTOMS
COUGH: 0
VOMITING: 0
TROUBLE SWALLOWING: 0
SHORTNESS OF BREATH: 1
CONSTIPATION: 1
COLOR CHANGE: 0
BLOOD IN STOOL: 0
SORE THROAT: 0
WHEEZING: 0
ABDOMINAL PAIN: 1
NAUSEA: 0
VOICE CHANGE: 0

## 2022-04-30 ENCOUNTER — HOSPITAL ENCOUNTER (OUTPATIENT)
Dept: CT IMAGING | Age: 77
Discharge: HOME | End: 2022-04-30
Payer: MEDICARE

## 2022-04-30 DIAGNOSIS — K43.9 VENTRAL HERNIA WITHOUT OBSTRUCTION OR GANGRENE: ICD-10-CM

## 2022-04-30 PROCEDURE — 6360000004 HC RX CONTRAST MEDICATION: Performed by: SURGERY

## 2022-04-30 PROCEDURE — 74176 CT ABD & PELVIS W/O CONTRAST: CPT

## 2022-04-30 RX ADMIN — IOHEXOL 50 ML: 240 INJECTION, SOLUTION INTRATHECAL; INTRAVASCULAR; INTRAVENOUS; ORAL at 08:35

## 2022-05-02 ENCOUNTER — OFFICE VISIT (OUTPATIENT)
Dept: SURGERY | Age: 77
End: 2022-05-02
Payer: MEDICARE

## 2022-05-02 VITALS
HEIGHT: 66 IN | SYSTOLIC BLOOD PRESSURE: 110 MMHG | BODY MASS INDEX: 29.11 KG/M2 | WEIGHT: 181.1 LBS | OXYGEN SATURATION: 95 % | DIASTOLIC BLOOD PRESSURE: 62 MMHG | TEMPERATURE: 97.8 F | HEART RATE: 73 BPM | RESPIRATION RATE: 18 BRPM

## 2022-05-02 DIAGNOSIS — K43.9 VENTRAL HERNIA WITHOUT OBSTRUCTION OR GANGRENE: Primary | ICD-10-CM

## 2022-05-02 DIAGNOSIS — R10.2 PELVIC PAIN: ICD-10-CM

## 2022-05-02 DIAGNOSIS — Z01.818 PRE-OP TESTING: ICD-10-CM

## 2022-05-02 PROCEDURE — 99213 OFFICE O/P EST LOW 20 MIN: CPT | Performed by: SURGERY

## 2022-05-02 ASSESSMENT — ENCOUNTER SYMPTOMS
COUGH: 0
COLOR CHANGE: 0
WHEEZING: 0
TROUBLE SWALLOWING: 0
VOMITING: 0
VOICE CHANGE: 0
BLOOD IN STOOL: 0
CONSTIPATION: 1
ABDOMINAL PAIN: 0
NAUSEA: 0
SORE THROAT: 0
SHORTNESS OF BREATH: 0

## 2022-05-02 NOTE — PROGRESS NOTES
Latonia Gonzalez MD   General Surgery  Follow-up patient Evaluation in Office  Pt Name: Isabell Lim  Date of Birth 1945   Today's Date: 5/2/2022  Medical Record Number: [id number]  Referring Provider: Godwin Ellison  Primary Care Provider: Mortimer Force, MD  Chief Complaint:  Chief Complaint   Patient presents with    Results     CT abdomen and pelvis-4/30/2022       ASSESSMENT      1. Ventral hernia without obstruction or gangrene    2. Pre-op testing    3. Pelvic pain    3. History of asthma  4. History of prior left inguinal hernia repair without clinical evidence of recurrence   5. History of peptic ulcer disease  6. Constipation  7. Diverticular disease  PLANS      1. Patient still does not have a discretely palpable recurrent inguinal hernia. 2.  Follow-up CT imaging reviewed. Small fat-containing ventral hernias inferior to old hernia. Not even discretely palpable on clinical examination. 3. In regards to her pelvic discomfort I am uncertain if surgery would alleviate those symptoms. Discussed with Everton Marie she may have some adhesive disease as well which could be contributing. At this point she does not wish to pursue surgical intervention which was offered. Just recommend no heavy lifting. We will follow with her in 3 to 4 months to reevaluate. In the interim should she develop symptoms or desire surgical intervention I will discuss   Kendrick Jamison is a 78y.o. year old female who is presenting today in the office for evaluation of a new problem with complaints of pelvic discomfort. In 2020 I repaired a left inguinal hernia which was incarcerated and contain omental fat. She recently has complained of pelvic discomfort. CT scan performed last year revealed small fascial defect periumbilical area. Everton Marie reports fatigue mid abdominal pain and some constipation. She has seen GI and had a more recent history of an ulcer and anemia. She continues to take Protonix.   She also complains of some fatigue and shortness of breath. She was seen at her PCP office for routine yearly follow-up. With these complaints she was sent to our office for evaluation of her hernias and abdominal symptoms. Interval history: Imaging reviewed. Images reviewed by myself. Small ventral hernia containing fat not discretely palpable. left inguinal hernia not clinically, either. We discussed surgical intervention with laparoscopy evaluation of her ventral hernias as well as left groin area and for any adhesions. Hernias today are not discretely palpable. She does not wish to pursue surgical intervention at this time we will maintain close follow-up and reevaluate in the office in 3 to 4 months. If symptoms should worsen in the interval and desires surgical intervention she is to contact our office. Signs and symptoms of incarceration and prescription discussed. I think there is very low risk of this happening.     Past Medical History  Past Medical History:   Diagnosis Date    Asthma     Glaucoma     Thyroid disease        Past Surgical History  Past Surgical History:   Procedure Laterality Date    CHOLECYSTECTOMY  2005    HERNIA REPAIR  [id number]    abdominal    HERNIA REPAIR Left 9/27/2020    HERNIA INGUINAL REPAIR WITH MESH performed by Obey Christianson MD at [facility]  5-13-12    transobturator pelvilace sling-Dr Arlene Gan UPPER GASTROINTESTINAL ENDOSCOPY N/A 11/21/2020    EGD CONTROL HEMORRHAGE performed by Rosa Durham MD at [facility]       Medications  Current Outpatient Medications   Medication Sig Dispense Refill    docusate sodium (COLACE, DULCOLAX) 100 MG CAPS Take 100 mg by mouth daily as needed for Constipation (Patient taking differently: Take 100 mg by mouth daily as needed for Constipation ) 60 capsule 0    pantoprazole (PROTONIX) 40 MG tablet Take 1 tablet by mouth 2 times daily (before meals) 30 tablet 3    levothyroxine (SYNTHROID) 112 MCG tablet Take 112 mcg by mouth Daily      Magnesium Oxide 600 MG CAPS Take 600 mg by mouth nightly       Melatonin 3 MG TABS Take 3 mg by mouth nightly.  MULTIPLE VITAMINS PO Take 1 tablet by mouth daily       Cholecalciferol (VITAMIN D) 2000 UNITS TABS Take 4,000 Units by mouth daily.  latanoprost (XALATAN) 0.005 % ophthalmic solution Place 1 drop into both eyes nightly        No current facility-administered medications for this visit. Allergies     Allergies   Allergen Reactions    Dye [Iodides] Anaphylaxis and Hives     Hives and throat swelling 2003     Optiray [Ioversol] Anaphylaxis     Throat swelling code blue was called and patient also develop hives    Codeine     Iodine     Pcn [Penicillins]     Phenergan [Promethazine] Other (See Comments)     seizure    Sulfa Antibiotics        Family History  Family History   Problem Relation Age of Onset    No Known Problems Mother     Cancer Father         lung       SocialHistory  Social History     Socioeconomic History    Marital status:      Spouse name: Not on file    Number of children: Not on file    Years of education: Not on file    Highest education level: Not on file   Occupational History    Not on file   Tobacco Use    Smoking status: Former Smoker    Smokeless tobacco: Never Used   Vaping Use    Vaping Use: Never used   Substance and Sexual Activity    Alcohol use: Not Currently    Drug use: Never    Sexual activity: Yes     Partners: Male   Other Topics Concern    Not on file   Social History Narrative    Not on file     Social Determinants of Health     Financial Resource Strain:     Difficulty of Paying Living Expenses: Not on file   Food Insecurity:     Worried About [address] in the Last Year: Not on file    Roque of Food in the Last Year: Not on file   Transportation Needs:     Lack of Transportation (Medical):  Not on file    Lack of Transportation (Non-Medical): Not on file   Physical Activity:     Days of Exercise per Week: Not on file    Minutes of Exercise per Session: Not on file   Stress:     Feeling of Stress : Not on file   Social Connections:     Frequency of Communication with Friends and Family: Not on file    Frequency of Social Gatherings with Friends and Family: Not on file    Attends Jew Services: Not on file    Active Member of [address] [place] [address] or Organizations: Not on file    Attends Club or Organization Meetings: Not on file    Marital Status: Not on file   Intimate Partner Violence:     Fear of Current or Ex-Partner: Not on file    Emotionally Abused: Not on file    Physically Abused: Not on file    Sexually Abused: Not on file   Housing Stability:     Unable to Pay for Housing in the Last Year: Not on file    Number of [place] in the Last Year: Not on file    Unstable Housing in the Last Year: Not on file           Review of Systems  Review of Systems   Constitutional: Positive for fatigue. Negative for chills, fever and unexpected weight change. HENT: Negative for sore throat, trouble swallowing and voice change. Eyes: Negative for visual disturbance. Respiratory: Negative for cough, shortness of breath and wheezing. Cardiovascular: Negative for chest pain and palpitations. Gastrointestinal: Positive for constipation. Negative for abdominal pain, blood in stool, nausea and vomiting. Chronic constipation   Endocrine: Negative for cold intolerance, heat intolerance and polydipsia. Genitourinary: Negative for dysuria, flank pain and hematuria. Musculoskeletal: Negative for arthralgias, gait problem, joint swelling and myalgias. Skin: Negative for color change and rash. Allergic/Immunologic: Negative for immunocompromised state. Neurological: Negative for dizziness, tremors, seizures and speech difficulty. Hematological: Does not bruise/bleed easily.    Psychiatric/Behavioral: Negative for agitation, behavioral problems and confusion. OBJECTIVE     /62 (Site: Left Upper Arm, Position: Sitting, Cuff Size: Medium Adult)   Pulse 73   Temp 97.8 °F (36.6 °C) (Temporal)   Resp 18   Ht 5' 6\" (1.676 m)   Wt 181 lb 1.6 oz (82.1 kg)   SpO2 95%   BMI 29.23 kg/m²      Physical Exam  Vitals reviewed. Constitutional:       Appearance: Normal appearance. She is well-developed. HENT:      Head: Normocephalic and atraumatic. Eyes:      General: No scleral icterus. Extraocular Movements: Extraocular movements intact. Pupils: Pupils are equal, round, and reactive to light. Neck:      Thyroid: No thyromegaly. Vascular: No JVD. Trachea: No tracheal deviation. Cardiovascular:      Rate and Rhythm: Normal rate and regular rhythm. Heart sounds: Normal heart sounds. Pulmonary:      Effort: No respiratory distress. Breath sounds: Normal breath sounds. No wheezing. Abdominal:      General: There is no distension. Palpations: Abdomen is soft. Tenderness: There is no abdominal tenderness. There is no guarding or rebound. Hernia: No hernia is present. There is no hernia in the left inguinal area or right inguinal area. Comments: Inguinal or ventral hernia not discretely palpable   Musculoskeletal:         General: Normal range of motion. Cervical back: Neck supple. Lymphadenopathy:      Cervical: No cervical adenopathy. Skin:     General: Skin is warm and dry. Findings: No rash. Neurological:      Mental Status: She is alert and oriented to person, place, and time. Cranial Nerves: No cranial nerve deficit.    Psychiatric:         Mood and Affect: Mood normal.         Lab Results   Component Value Date    WBC 6.7 05/12/2021    HGB 13.0 05/12/2021    HCT 39.9 05/12/2021     05/12/2021    ALT 21 05/12/2021    AST 25 05/12/2021     05/12/2021    K 4.2 05/12/2021     05/12/2021    CREATININE 0.6 05/12/2021    BUN 10 05/12/2021    CO2 30 05/12/2021    TSH 0.039 (L) 05/08/2012        PROCEDURE: CT ABDOMEN PELVIS WO CONTRAST       CLINICAL INFORMATION: Ventral hernia without obstruction or gangrene .           TECHNIQUE: 2-D multiplanar postcontrast images of the abdomen and pelvis 5 mm intervals. Omnipaque 240 oral contrast only   All CT scans at this facility use dose modulation, iterative reconstruction, and/or weight-based dosing when appropriate to reduce radiation dose to as low as reasonably achievable.       COMPARISON: 5/12/2021           FINDINGS: Lung bases       Lung bases are clear undersurface the heart demonstrates coronary artery calcifications otherwise unremarkable. Small hiatal hernia.       Abdomen pelvis   Solid organ evaluation limited without IV contrast (patient with prior anaphylactic reaction to contrast). Liver, spleen, and pancreas are within normal limits. Status post cholecystectomy. Adrenals and kidneys are unremarkable. Large duodenal diverticulum third portion duodenum. No adenopathy. Aorta is atherosclerotic.   2 adjacent fat-containing ventral hernias inferior to the prior) hernia. Image 32 series 2. No bowel obstruction. Uncomplicated diverticulosis. Urinary bladder is distended. Otherwise unremarkable. Uterus is surgically absent. No suspicious bone lesions. Liver, spleen, and pancreas are within normal limits.           Impression   1. No acute abdominal or pelvic abnormalities. 2. Ventral hernias demonstrated. 3. Uncomplicated diverticulosis. Other chronic findings detailed above report               **This report has been created using voice recognition software.  It may contain minor errors which are inherent in voice recognition technology. **       Final report electronically signed by Dr. Zaheer Lloyd on 4/30/2022 9:59 AM           CT imaging reviewed

## 2022-11-01 ENCOUNTER — HOSPITAL ENCOUNTER (OUTPATIENT)
Dept: NON INVASIVE DIAGNOSTICS | Age: 77
Discharge: HOME | End: 2022-11-01
Payer: MEDICARE

## 2022-11-01 VITALS — BODY MASS INDEX: 29.99 KG/M2 | WEIGHT: 180 LBS | HEIGHT: 65 IN

## 2022-11-01 DIAGNOSIS — R06.00 DYSPNEA, UNSPECIFIED TYPE: ICD-10-CM

## 2022-11-01 DIAGNOSIS — I49.1 PAC (PREMATURE ATRIAL CONTRACTION): ICD-10-CM

## 2022-11-01 DIAGNOSIS — Z87.891 FORMER CIGARETTE SMOKER: ICD-10-CM

## 2022-11-01 LAB
LV EF: 60 %
LVEF MODALITY: NORMAL

## 2022-11-01 PROCEDURE — A9500 TC99M SESTAMIBI: HCPCS | Performed by: NUCLEAR MEDICINE

## 2022-11-01 PROCEDURE — 93017 CV STRESS TEST TRACING ONLY: CPT | Performed by: NUCLEAR MEDICINE

## 2022-11-01 PROCEDURE — 78452 HT MUSCLE IMAGE SPECT MULT: CPT

## 2022-11-01 PROCEDURE — 3430000000 HC RX DIAGNOSTIC RADIOPHARMACEUTICAL: Performed by: NUCLEAR MEDICINE

## 2022-11-01 PROCEDURE — 6360000002 HC RX W HCPCS

## 2022-11-01 PROCEDURE — 93306 TTE W/DOPPLER COMPLETE: CPT

## 2022-11-01 RX ADMIN — Medication 34.4 MILLICURIE: at 08:55

## 2022-11-01 RX ADMIN — Medication 11 MILLICURIE: at 08:15

## 2024-01-19 ENCOUNTER — HOSPITAL ENCOUNTER (OUTPATIENT)
Dept: CT IMAGING | Age: 79
Discharge: HOME | End: 2024-01-19
Attending: RADIOLOGY

## 2024-01-19 DIAGNOSIS — Z00.6 ENCOUNTER FOR EXAMINATION FOR NORMAL COMPARISON OR CONTROL IN CLINICAL RESEARCH PROGRAM: ICD-10-CM

## 2024-01-22 ENCOUNTER — OFFICE VISIT (OUTPATIENT)
Dept: SURGERY | Age: 79
End: 2024-01-22
Payer: MEDICARE

## 2024-01-22 VITALS
HEIGHT: 65 IN | SYSTOLIC BLOOD PRESSURE: 124 MMHG | WEIGHT: 177.4 LBS | DIASTOLIC BLOOD PRESSURE: 62 MMHG | BODY MASS INDEX: 29.56 KG/M2 | RESPIRATION RATE: 16 BRPM | HEART RATE: 89 BPM | TEMPERATURE: 98 F | OXYGEN SATURATION: 99 %

## 2024-01-22 DIAGNOSIS — R10.2 PELVIC PAIN: Primary | ICD-10-CM

## 2024-01-22 DIAGNOSIS — K43.2 INCISIONAL HERNIA, WITHOUT OBSTRUCTION OR GANGRENE: ICD-10-CM

## 2024-01-22 PROCEDURE — G8427 DOCREV CUR MEDS BY ELIG CLIN: HCPCS | Performed by: SURGERY

## 2024-01-22 PROCEDURE — G8419 CALC BMI OUT NRM PARAM NOF/U: HCPCS | Performed by: SURGERY

## 2024-01-22 PROCEDURE — G8400 PT W/DXA NO RESULTS DOC: HCPCS | Performed by: SURGERY

## 2024-01-22 PROCEDURE — 1123F ACP DISCUSS/DSCN MKR DOCD: CPT | Performed by: SURGERY

## 2024-01-22 PROCEDURE — 1090F PRES/ABSN URINE INCON ASSESS: CPT | Performed by: SURGERY

## 2024-01-22 PROCEDURE — 99214 OFFICE O/P EST MOD 30 MIN: CPT | Performed by: SURGERY

## 2024-01-22 PROCEDURE — G8484 FLU IMMUNIZE NO ADMIN: HCPCS | Performed by: SURGERY

## 2024-01-22 PROCEDURE — 1036F TOBACCO NON-USER: CPT | Performed by: SURGERY

## 2024-01-22 RX ORDER — METHOCARBAMOL 500 MG/1
500 TABLET, FILM COATED ORAL EVERY 8 HOURS
COMMUNITY
Start: 2024-01-16

## 2024-01-22 ASSESSMENT — ENCOUNTER SYMPTOMS
TROUBLE SWALLOWING: 0
VOMITING: 0
VOICE CHANGE: 0
NAUSEA: 0
COUGH: 0
SORE THROAT: 0
BLOOD IN STOOL: 0
WHEEZING: 0
COLOR CHANGE: 0

## 2024-01-22 NOTE — PROGRESS NOTES
Sushila Chakraborty MD   General Surgery  Follow-up patient Evaluation in Office  Pt Name: DANIELLE MARIEE  Date of Birth 1945   Today's Date: 1/22/2024  Medical Record Number: [id number]  Referring Provider: ELIZABETH Back  Primary Care Provider: Gabriel March MD  Chief Complaint:  Chief Complaint   Patient presents with    Surgical Consult     Est pt seen 2022 refer JORGE A Liza-Umbilical hernia       ASSESSMENT      1. Pelvic pain    2. Incisional hernia, without obstruction or gangrene    3.  History of asthma  4.  History of prior left inguinal hernia repair without clinical evidence of  obvious recurrence.    5.  History of peptic ulcer disease  6.  Constipation  7.  Diverticular disease  PLANS      1.   Patient still does not have a discretely palpable recurrent inguinal hernia.  2.  Follow-up CT imaging reviewed.  Small fat-containing ventral hernias inferior to old hernia.  Not even discretely palpable on clinical examination.    3.  In regards to her pelvic discomfort I am uncertain if surgery would alleviate those symptoms.  At some point she may warrant laparoscopy.  Discussed with Danielle she may have some adhesive disease as well which could be contributing.  At this point she is scheduled for eye surgery in February.  We will follow-up with her after her eye surgery to reevaluate and discuss options for management.  SUBJECTIVE     DANIELLE is a 78 y.o. year old female who is presenting today in the office for evaluation of a new problem with complaints of pelvic discomfort.  In 2020 I repaired a l femoral hernia which was incarcerated and contain omental fat.  She recently has complained of pelvic discomfort.  CT scan performed last year revealed small fascial defect periumbilical area.  Danielle reports fatigue mid abdominal pain and some constipation.  She has seen GI and had a more recent history of an ulcer and anemia.  She continues to take Protonix.  She also complains of some fatigue and shortness of

## 2024-01-24 ASSESSMENT — ENCOUNTER SYMPTOMS
PHOTOPHOBIA: 1
SHORTNESS OF BREATH: 1
ABDOMINAL PAIN: 1
CONSTIPATION: 0

## 2024-04-04 ENCOUNTER — OFFICE VISIT (OUTPATIENT)
Dept: SURGERY | Age: 79
End: 2024-04-04
Payer: MEDICARE

## 2024-04-04 VITALS
OXYGEN SATURATION: 93 % | TEMPERATURE: 98.4 F | WEIGHT: 179.7 LBS | DIASTOLIC BLOOD PRESSURE: 64 MMHG | HEIGHT: 65 IN | BODY MASS INDEX: 29.94 KG/M2 | HEART RATE: 73 BPM | SYSTOLIC BLOOD PRESSURE: 102 MMHG

## 2024-04-04 DIAGNOSIS — K43.2 INCISIONAL HERNIA, WITHOUT OBSTRUCTION OR GANGRENE: ICD-10-CM

## 2024-04-04 DIAGNOSIS — R10.2 PELVIC PAIN: Primary | ICD-10-CM

## 2024-04-04 DIAGNOSIS — Z01.818 PRE-OP TESTING: ICD-10-CM

## 2024-04-04 PROCEDURE — G8400 PT W/DXA NO RESULTS DOC: HCPCS | Performed by: SURGERY

## 2024-04-04 PROCEDURE — G8427 DOCREV CUR MEDS BY ELIG CLIN: HCPCS | Performed by: SURGERY

## 2024-04-04 PROCEDURE — 1090F PRES/ABSN URINE INCON ASSESS: CPT | Performed by: SURGERY

## 2024-04-04 PROCEDURE — 1123F ACP DISCUSS/DSCN MKR DOCD: CPT | Performed by: SURGERY

## 2024-04-04 PROCEDURE — 1036F TOBACCO NON-USER: CPT | Performed by: SURGERY

## 2024-04-04 PROCEDURE — 99214 OFFICE O/P EST MOD 30 MIN: CPT | Performed by: SURGERY

## 2024-04-04 PROCEDURE — G8419 CALC BMI OUT NRM PARAM NOF/U: HCPCS | Performed by: SURGERY

## 2024-04-04 RX ORDER — LEVOTHYROXINE SODIUM 0.1 MG/1
100 TABLET ORAL DAILY
COMMUNITY
Start: 2024-02-28

## 2024-04-04 ASSESSMENT — ENCOUNTER SYMPTOMS
SORE THROAT: 0
SHORTNESS OF BREATH: 1
VOMITING: 0
BLOOD IN STOOL: 0
ABDOMINAL PAIN: 1
COUGH: 0
VOICE CHANGE: 0
COLOR CHANGE: 0
CONSTIPATION: 0
WHEEZING: 0
TROUBLE SWALLOWING: 0
NAUSEA: 0
PHOTOPHOBIA: 1

## 2024-04-04 NOTE — PROGRESS NOTES
Sushila Chakraborty MD   General Surgery  Follow-up patient Evaluation in Office  Pt Name: DANNI MARIEE  Date of Birth 1945   Today's Date: 4/4/2024  Medical Record Number: [id number]  Referring Provider: ELIZABETH Back  Primary Care Provider: Gabriel March MD  Chief Complaint:  Chief Complaint   Patient presents with    Follow-up     Pelvic pain, Incisional hernia-LOV--1/22/24           ASSESSMENT      1. Pelvic pain    2. Incisional hernia, without obstruction or gangrene    3. Pre-op testing    3.  History of asthma  4.  History of prior left inguinal hernia repair without clinical evidence of  obvious recurrence.    5.  History of peptic ulcer disease  6.  Constipation-chronic.  Reports daily bowel movements.  7.  Diverticular disease  PLANS      1.   Patient still does not have a discretely palpable recurrent inguinal hernia.  2.  Follow-up CT imaging reviewed.  Small fat-containing ventral hernias inferior to old hernia.  Not even discretely palpable on clinical examination.    3.  In regards to her pelvic discomfort I am uncertain if surgery would alleviate those symptoms.  However, as there is no other clear etiology and she has persistent symptoms I have offered diagnostic and potentially therapeutic laparoscopy.  Discussed with Danni she may have some adhesive disease as well which could be contributing.  Patient with persistent intermittent cramping pelvic symptoms that are diminishing her quality of life.  She wishes to proceed with surgical intervention.  4.  Check labs and urinalysis for completeness prior to surgery.  5.  Schedule patient for robotic assisted laparoscopy, lysis of adhesions, repair of recurrent hernia, possible open procedure.  SUBJECTIVE   History of present illness:  DANNI is a 79 y.o. year old female who is presenting today in the office for evaluation of a new problem with complaints of pelvic discomfort.  In 2020 I repaired a l femoral hernia which was incarcerated and

## 2024-04-05 PROBLEM — K43.2 INCISIONAL HERNIA, WITHOUT OBSTRUCTION OR GANGRENE: Status: ACTIVE | Noted: 2024-04-05

## 2024-04-05 PROBLEM — R10.2 PELVIC PAIN: Status: ACTIVE | Noted: 2024-04-05

## 2024-04-10 LAB
ABSOLUTE BASO #: 0.02 K/UL (ref 0–0.2)
ABSOLUTE EOS #: 0.13 K/UL (ref 0–0.5)
ABSOLUTE LYMPH #: 1.57 K/UL (ref 1–4)
ABSOLUTE MONO #: 0.66 K/UL (ref 0.2–1)
ABSOLUTE NEUT #: 2.97 K/UL (ref 1.5–7.5)
ANION GAP SERPL CALCULATED.3IONS-SCNC: 9 MEQ/L (ref 7–16)
APPEARANCE: ABNORMAL
BACTERIA: ABNORMAL PER HPF
BASOPHILS RELATIVE PERCENT: 0.4 %
BILIRUBIN: NEGATIVE
BUN BLDV-MCNC: 8 MG/DL (ref 8–23)
CALCIUM SERPL-MCNC: 9.8 MG/DL (ref 8.5–10.5)
CHLORIDE BLD-SCNC: 101 MEQ/L (ref 95–107)
CO2: 31 MEQ/L (ref 19–31)
COLOR: YELLOW
CREAT SERPL-MCNC: 0.49 MG/DL (ref 0.6–1.3)
EGFR IF NONAFRICAN AMERICAN: 97 ML/MIN/1.73
EOSINOPHILS RELATIVE PERCENT: 2.4 %
EPITHELIAL CELLS: ABNORMAL PER HPF (ref 0–10)
GLUCOSE BLD-MCNC: NEGATIVE MG/DL
GLUCOSE: 88 MG/DL (ref 70–99)
HCT VFR BLD CALC: 40 % (ref 34–45)
HEMOGLOBIN: 13.1 G/DL (ref 11.5–15.5)
HYALINE CASTS: ABNORMAL
IMMATURE GRANULOCYTES %: 0.2 %
KETONES, URINE: NEGATIVE
LEUKOCYTE ESTERASE, URINE: ABNORMAL
LYMPHOCYTE %: 29.3 %
MCH RBC QN AUTO: 29.9 PG (ref 25–33)
MCHC RBC AUTO-ENTMCNC: 32.8 G/DL (ref 31–36)
MCV RBC AUTO: 91.3 FL (ref 80–99)
MONOCYTES # BLD: 12.3 %
NEUTROPHILS RELATIVE PERCENT: 55.4 %
NITRITE, URINE: NEGATIVE
OCCULT BLOOD,URINE: ABNORMAL
PDW BLD-RTO: 12.7 % (ref 11.5–15)
PH: 7 (ref 5–9)
PLATELETS: 209 K/UL (ref 130–400)
PMV BLD AUTO: 11.4 FL (ref 9.3–13)
POTASSIUM SERPL-SCNC: 5.2 MEQ/L (ref 3.5–5.4)
PROTEIN, URINE: NEGATIVE
RBC: 4.38 M/UL (ref 3.8–5.4)
RBC: ABNORMAL PER HPF (ref 0–5)
SODIUM BLD-SCNC: 141 MEQ/L (ref 133–146)
SP GRAVITY MISCELLANEOUS: 1.01 (ref 1–1.03)
UROBILINOGEN, URINE: 0.2
WBC: 5.4 K/UL (ref 3.5–11)
WBC: >50 PER HPF (ref 0–5)

## 2024-04-12 ENCOUNTER — TELEPHONE (OUTPATIENT)
Dept: SURGERY | Age: 79
End: 2024-04-12

## 2024-04-12 LAB — URINE CULTURE, ROUTINE: ABNORMAL

## 2024-04-12 RX ORDER — LEVOFLOXACIN 500 MG/1
500 TABLET, FILM COATED ORAL DAILY
Qty: 7 TABLET | Refills: 0 | Status: SHIPPED | OUTPATIENT
Start: 2024-04-12 | End: 2024-04-19

## 2024-04-12 NOTE — TELEPHONE ENCOUNTER
Pt notified of UA results. Pt stated she did have some back pain, no dysuria, no fevers. Rx sent. Pt notified

## 2024-04-15 ENCOUNTER — TELEPHONE (OUTPATIENT)
Dept: SURGERY | Age: 79
End: 2024-04-15

## 2024-04-15 NOTE — TELEPHONE ENCOUNTER
Pt called in, has been sick over the last few days with a cough.  PCP put her on antibiotics.  Advised pt with her coughing we should r/s surgery.    Surgery r/s'd to 5/1, arrive at 6 am. Pt voiced understanding

## 2024-04-22 ENCOUNTER — TELEPHONE (OUTPATIENT)
Dept: SURGERY | Age: 79
End: 2024-04-22

## 2024-04-22 NOTE — TELEPHONE ENCOUNTER
Pt called in wanting to cancel surgery for 5/1 stating she isn't having any problems right now and is moving.  She will call back to r/s if she starts to have problems.    Called OR, surgery cancelled

## 2024-11-29 ENCOUNTER — APPOINTMENT (OUTPATIENT)
Dept: GENERAL RADIOLOGY | Age: 79
End: 2024-11-29
Payer: MEDICARE

## 2024-11-29 ENCOUNTER — HOSPITAL ENCOUNTER (EMERGENCY)
Age: 79
Discharge: HOME | End: 2024-11-29
Payer: MEDICARE

## 2024-11-29 VITALS
HEIGHT: 66 IN | SYSTOLIC BLOOD PRESSURE: 114 MMHG | BODY MASS INDEX: 27 KG/M2 | WEIGHT: 168 LBS | HEART RATE: 78 BPM | TEMPERATURE: 98.5 F | OXYGEN SATURATION: 95 % | RESPIRATION RATE: 22 BRPM | DIASTOLIC BLOOD PRESSURE: 82 MMHG

## 2024-11-29 DIAGNOSIS — J20.9 ACUTE BRONCHITIS, UNSPECIFIED ORGANISM: Primary | ICD-10-CM

## 2024-11-29 PROCEDURE — 99213 OFFICE O/P EST LOW 20 MIN: CPT

## 2024-11-29 PROCEDURE — 71046 X-RAY EXAM CHEST 2 VIEWS: CPT

## 2024-11-29 PROCEDURE — 6370000000 HC RX 637 (ALT 250 FOR IP)

## 2024-11-29 PROCEDURE — 94640 AIRWAY INHALATION TREATMENT: CPT

## 2024-11-29 RX ORDER — IPRATROPIUM BROMIDE AND ALBUTEROL SULFATE 2.5; .5 MG/3ML; MG/3ML
1 SOLUTION RESPIRATORY (INHALATION) ONCE
Status: COMPLETED | OUTPATIENT
Start: 2024-11-29 | End: 2024-11-29

## 2024-11-29 RX ORDER — AZITHROMYCIN 250 MG/1
TABLET, FILM COATED ORAL
Qty: 6 TABLET | Refills: 0 | Status: SHIPPED | OUTPATIENT
Start: 2024-11-29 | End: 2024-12-09

## 2024-11-29 RX ORDER — BENZONATATE 200 MG/1
200 CAPSULE ORAL 3 TIMES DAILY PRN
Qty: 21 CAPSULE | Refills: 0 | Status: SHIPPED | OUTPATIENT
Start: 2024-11-29 | End: 2024-12-06

## 2024-11-29 RX ORDER — PREDNISONE 20 MG/1
20 TABLET ORAL 2 TIMES DAILY
Qty: 10 TABLET | Refills: 0 | Status: SHIPPED | OUTPATIENT
Start: 2024-11-29 | End: 2024-12-04

## 2024-11-29 RX ADMIN — IPRATROPIUM BROMIDE AND ALBUTEROL SULFATE 1 DOSE: .5; 3 SOLUTION RESPIRATORY (INHALATION) at 11:22

## 2024-11-29 ASSESSMENT — ENCOUNTER SYMPTOMS
EYES NEGATIVE: 1
GASTROINTESTINAL NEGATIVE: 1
ALLERGIC/IMMUNOLOGIC NEGATIVE: 1
COUGH: 1

## 2024-11-29 ASSESSMENT — PAIN - FUNCTIONAL ASSESSMENT: PAIN_FUNCTIONAL_ASSESSMENT: NONE - DENIES PAIN

## 2024-11-29 NOTE — DISCHARGE INSTRUCTIONS
Medications as prescribed.  Start at home nebulizer treatments.  Increase fluid intake.  Can use over-the-counter Motrin or Tylenol.  Follow-up with family doctor in 3 to 5 days.  Go to emergency room for any difficulty breathing or any new or worsening symptoms.

## 2024-11-29 NOTE — ED TRIAGE NOTES
Patient ambulated to room with complaint of cough that started 1 week ago. Informs she has a history of RSV and copd.

## 2024-11-29 NOTE — ED PROVIDER NOTES
present.   Eyes:      Conjunctiva/sclera: Conjunctivae normal.   Cardiovascular:      Rate and Rhythm: Normal rate and regular rhythm.      Heart sounds: Normal heart sounds.   Pulmonary:      Effort: Pulmonary effort is normal.      Breath sounds: Examination of the right-lower field reveals decreased breath sounds. Examination of the left-lower field reveals decreased breath sounds. Decreased breath sounds present.   Lymphadenopathy:      Cervical: Cervical adenopathy present.   Skin:     General: Skin is warm and dry.      Capillary Refill: Capillary refill takes less than 2 seconds.   Neurological:      General: No focal deficit present.      Mental Status: She is alert and oriented to person, place, and time.   Psychiatric:         Mood and Affect: Mood normal.         Behavior: Behavior normal.         DIAGNOSTIC RESULTS     Labs:No results found for this visit on 11/29/24.    IMAGING:    XR CHEST (2 VW)   Final Result   1. No acute cardiopulmonary finding..               **This report has been created using voice recognition software.  It may contain   minor errors which are inherent in voice recognition technology.**      Electronically signed by Dr. Lisa Cazares            EKG:      URGENT CARE COURSE:     Vitals:    11/29/24 1053 11/29/24 1058   BP: (!) 167/150 114/82   Pulse: 78    Resp: 22    Temp: 98.5 °F (36.9 °C)    TempSrc: Temporal    SpO2: 95%    Weight: 76.2 kg (168 lb)    Height: 1.664 m (5' 5.5\")        Medications   ipratropium 0.5 mg-albuterol 2.5 mg (DUONEB) nebulizer solution 1 Dose (1 Dose Inhalation Given 11/29/24 1122)            PROCEDURES:  None    FINAL IMPRESSION      1. Acute bronchitis, unspecified organism          DISPOSITION/ PLAN     Patient seen and evaluated for the above symptoms.  Assessment reveals with acute bronchitis.  Patient is provided a prescription for prednisone, Tessalon, and azithromycin.  Instructed use over-the-counter Tylenol and Motrin for pain or fever.

## 2025-02-28 ENCOUNTER — HOSPITAL ENCOUNTER (EMERGENCY)
Age: 80
Discharge: HOME | End: 2025-02-28
Payer: MEDICARE

## 2025-02-28 VITALS
WEIGHT: 168 LBS | HEART RATE: 68 BPM | SYSTOLIC BLOOD PRESSURE: 101 MMHG | OXYGEN SATURATION: 95 % | BODY MASS INDEX: 27.53 KG/M2 | DIASTOLIC BLOOD PRESSURE: 88 MMHG | RESPIRATION RATE: 20 BRPM | TEMPERATURE: 98.2 F

## 2025-02-28 DIAGNOSIS — B37.81 THRUSH OF MOUTH AND ESOPHAGUS (HCC): Primary | ICD-10-CM

## 2025-02-28 DIAGNOSIS — B37.0 THRUSH OF MOUTH AND ESOPHAGUS (HCC): Primary | ICD-10-CM

## 2025-02-28 PROCEDURE — 99213 OFFICE O/P EST LOW 20 MIN: CPT

## 2025-02-28 PROCEDURE — 99213 OFFICE O/P EST LOW 20 MIN: CPT | Performed by: NURSE PRACTITIONER

## 2025-02-28 RX ORDER — DORZOLAMIDE HCL 20 MG/ML
1 SOLUTION/ DROPS OPHTHALMIC 2 TIMES DAILY
COMMUNITY
Start: 2025-02-04

## 2025-02-28 RX ORDER — BRIMONIDINE TARTRATE 1.5 MG/ML
1 SOLUTION/ DROPS OPHTHALMIC 2 TIMES DAILY
COMMUNITY
Start: 2025-02-19

## 2025-02-28 RX ORDER — LIDOCAINE HYDROCHLORIDE 20 MG/ML
10 SOLUTION OROPHARYNGEAL PRN
Qty: 100 ML | Refills: 0 | Status: SHIPPED | OUTPATIENT
Start: 2025-02-28

## 2025-02-28 RX ORDER — NYSTATIN 100000 [USP'U]/ML
500000 SUSPENSION ORAL 4 TIMES DAILY
Qty: 240 ML | Refills: 0 | Status: SHIPPED | OUTPATIENT
Start: 2025-02-28

## 2025-02-28 ASSESSMENT — PAIN DESCRIPTION - LOCATION: LOCATION: MOUTH

## 2025-02-28 ASSESSMENT — PAIN DESCRIPTION - FREQUENCY: FREQUENCY: CONTINUOUS

## 2025-02-28 ASSESSMENT — PAIN DESCRIPTION - PAIN TYPE: TYPE: ACUTE PAIN

## 2025-02-28 ASSESSMENT — PAIN DESCRIPTION - ONSET: ONSET: PROGRESSIVE

## 2025-02-28 ASSESSMENT — PAIN DESCRIPTION - DESCRIPTORS: DESCRIPTORS: BURNING

## 2025-02-28 ASSESSMENT — PAIN - FUNCTIONAL ASSESSMENT
PAIN_FUNCTIONAL_ASSESSMENT: PREVENTS OR INTERFERES SOME ACTIVE ACTIVITIES AND ADLS
PAIN_FUNCTIONAL_ASSESSMENT: 0-10

## 2025-02-28 ASSESSMENT — PAIN SCALES - GENERAL: PAINLEVEL_OUTOF10: 8

## 2025-03-01 NOTE — ED PROVIDER NOTES
[facility]  UrgentCare Encounter      CHIEFCOMPLAINT       Chief Complaint   Patient presents with    Thrush     \"I have a coating on my mouth and tongue\"      Took Tamiflu last week for Influenza and then Azithromycin       Nurses Notes reviewed and I agree except as noted in the HPI.  HISTORY OF PRESENT ILLNESS   DANNI MARIEE is a 80 y.o. female who presents to urgent care with complaints of mouth pain.  She is recently on Tamiflu and then azithromycin.  States that her mouth is very painful and \"burning\".    REVIEW OF SYSTEMS     Review of Systems   HENT:          Mouth pain       PAST MEDICAL HISTORY         Diagnosis Date    Asthma     Glaucoma     Thyroid disease        SURGICAL HISTORY     Patient  has a past surgical history that includes Hysterectomy (01/01/1973); Cholecystectomy (06/23/2005); Incontinence surgery (05/13/2012); hernia repair (Left, 09/27/2020); Upper gastrointestinal endoscopy (N/A, 11/21/2020); ventral hernia repair (09/25/2009); and Colonoscopy (12/22/2020).    CURRENT MEDICATIONS       Discharge Medication List as of 2/28/2025  7:30 PM        CONTINUE these medications which have NOT CHANGED    Details   dextromethorphan-guaiFENesin (MUCINEX DM)  MG per extended release tablet Take 1 tablet by mouth every 12 hours as neededHistorical Med      levothyroxine (SYNTHROID) 100 MCG tablet Take 1 tablet by mouth DailyHistorical Med      OXYGEN Inhale into the lungsHistorical Med      docusate sodium (COLACE, DULCOLAX) 100 MG CAPS Take 100 mg by mouth daily as needed for Constipation, Disp-60 capsule,R-0Normal      pantoprazole (PROTONIX) 40 MG tablet Take 1 tablet by mouth 2 times daily (before meals), Disp-30 tablet,R-3Normal      Melatonin 3 MG TABS Take 1 tablet by mouth nightlyHistorical Med      MULTIPLE VITAMINS PO Take 1 tablet by mouth daily Historical Med      Cholecalciferol (VITAMIN D) 2000 UNITS TABS Take 2 tablets by mouth dailyHistorical Med

## 2025-08-05 ENCOUNTER — HOSPITAL ENCOUNTER (EMERGENCY)
Age: 80
Discharge: ANOTHER HOSPITAL | End: 2025-08-05
Payer: MEDICARE

## 2025-08-05 VITALS
BODY MASS INDEX: 27.86 KG/M2 | OXYGEN SATURATION: 96 % | SYSTOLIC BLOOD PRESSURE: 126 MMHG | DIASTOLIC BLOOD PRESSURE: 69 MMHG | WEIGHT: 170 LBS | RESPIRATION RATE: 18 BRPM | HEART RATE: 99 BPM | TEMPERATURE: 97.9 F

## 2025-08-05 DIAGNOSIS — R07.9 CHEST PAIN, UNSPECIFIED TYPE: Primary | ICD-10-CM

## 2025-08-05 LAB
EKG ATRIAL RATE: 88 BPM
EKG P AXIS: 81 DEGREES
EKG P-R INTERVAL: 194 MS
EKG Q-T INTERVAL: 352 MS
EKG QRS DURATION: 74 MS
EKG QTC CALCULATION (BAZETT): 425 MS
EKG R AXIS: 52 DEGREES
EKG T AXIS: 67 DEGREES
EKG VENTRICULAR RATE: 88 BPM

## 2025-08-05 PROCEDURE — 93010 ELECTROCARDIOGRAM REPORT: CPT | Performed by: INTERNAL MEDICINE

## 2025-08-05 PROCEDURE — 93005 ELECTROCARDIOGRAM TRACING: CPT | Performed by: NURSE PRACTITIONER

## 2025-08-05 PROCEDURE — 99215 OFFICE O/P EST HI 40 MIN: CPT

## 2025-08-05 PROCEDURE — 99214 OFFICE O/P EST MOD 30 MIN: CPT | Performed by: NURSE PRACTITIONER

## 2025-08-05 RX ORDER — AZITHROMYCIN 250 MG/1
TABLET, FILM COATED ORAL
COMMUNITY
Start: 2025-06-09

## 2025-08-05 ASSESSMENT — PAIN DESCRIPTION - LOCATION: LOCATION: CHEST

## 2025-08-05 ASSESSMENT — PAIN SCALES - GENERAL: PAINLEVEL_OUTOF10: 8

## 2025-08-05 ASSESSMENT — ENCOUNTER SYMPTOMS
NAUSEA: 1
SHORTNESS OF BREATH: 1

## 2025-08-05 ASSESSMENT — PAIN DESCRIPTION - DESCRIPTORS: DESCRIPTORS: BURNING

## 2025-08-05 ASSESSMENT — PAIN DESCRIPTION - PAIN TYPE: TYPE: ACUTE PAIN

## 2025-08-05 ASSESSMENT — PAIN DESCRIPTION - ONSET: ONSET: PROGRESSIVE

## 2025-08-05 ASSESSMENT — PAIN - FUNCTIONAL ASSESSMENT
PAIN_FUNCTIONAL_ASSESSMENT: 0-10
PAIN_FUNCTIONAL_ASSESSMENT: PREVENTS OR INTERFERES SOME ACTIVE ACTIVITIES AND ADLS

## 2025-08-05 ASSESSMENT — PAIN DESCRIPTION - FREQUENCY: FREQUENCY: CONTINUOUS
